# Patient Record
Sex: FEMALE | Race: OTHER | NOT HISPANIC OR LATINO | URBAN - METROPOLITAN AREA
[De-identification: names, ages, dates, MRNs, and addresses within clinical notes are randomized per-mention and may not be internally consistent; named-entity substitution may affect disease eponyms.]

---

## 2018-05-25 ENCOUNTER — EMERGENCY (EMERGENCY)
Facility: HOSPITAL | Age: 38
LOS: 1 days | Discharge: HOME | End: 2018-05-25
Attending: EMERGENCY MEDICINE | Admitting: EMERGENCY MEDICINE
Payer: COMMERCIAL

## 2018-05-25 VITALS
DIASTOLIC BLOOD PRESSURE: 97 MMHG | OXYGEN SATURATION: 99 % | RESPIRATION RATE: 16 BRPM | SYSTOLIC BLOOD PRESSURE: 130 MMHG | HEART RATE: 79 BPM

## 2018-05-25 VITALS
HEART RATE: 80 BPM | RESPIRATION RATE: 16 BRPM | SYSTOLIC BLOOD PRESSURE: 138 MMHG | DIASTOLIC BLOOD PRESSURE: 98 MMHG | OXYGEN SATURATION: 98 % | TEMPERATURE: 98 F

## 2018-05-25 DIAGNOSIS — Z98.1 ARTHRODESIS STATUS: Chronic | ICD-10-CM

## 2018-05-25 LAB
ALBUMIN SERPL ELPH-MCNC: 4.5 G/DL — SIGNIFICANT CHANGE UP (ref 3.3–5)
ALP SERPL-CCNC: 58 U/L — SIGNIFICANT CHANGE UP (ref 40–120)
ALT FLD-CCNC: 9 U/L — SIGNIFICANT CHANGE UP (ref 4–33)
APTT BLD: 29.6 SEC — SIGNIFICANT CHANGE UP (ref 27.5–37.4)
AST SERPL-CCNC: 23 U/L — SIGNIFICANT CHANGE UP (ref 4–32)
BASOPHILS # BLD AUTO: 0.06 K/UL — SIGNIFICANT CHANGE UP (ref 0–0.2)
BASOPHILS NFR BLD AUTO: 0.7 % — SIGNIFICANT CHANGE UP (ref 0–2)
BILIRUB SERPL-MCNC: 0.3 MG/DL — SIGNIFICANT CHANGE UP (ref 0.2–1.2)
BUN SERPL-MCNC: 12 MG/DL — SIGNIFICANT CHANGE UP (ref 7–23)
CALCIUM SERPL-MCNC: 8.9 MG/DL — SIGNIFICANT CHANGE UP (ref 8.4–10.5)
CHLORIDE SERPL-SCNC: 102 MMOL/L — SIGNIFICANT CHANGE UP (ref 98–107)
CO2 SERPL-SCNC: 23 MMOL/L — SIGNIFICANT CHANGE UP (ref 22–31)
CREAT SERPL-MCNC: 0.68 MG/DL — SIGNIFICANT CHANGE UP (ref 0.5–1.3)
D DIMER BLD IA.RAPID-MCNC: < 150 NG/ML — SIGNIFICANT CHANGE UP
EOSINOPHIL # BLD AUTO: 0.15 K/UL — SIGNIFICANT CHANGE UP (ref 0–0.5)
EOSINOPHIL NFR BLD AUTO: 1.7 % — SIGNIFICANT CHANGE UP (ref 0–6)
GLUCOSE SERPL-MCNC: 96 MG/DL — SIGNIFICANT CHANGE UP (ref 70–99)
HCG SERPL-ACNC: < 5 MIU/ML — SIGNIFICANT CHANGE UP
HCT VFR BLD CALC: 42.8 % — SIGNIFICANT CHANGE UP (ref 34.5–45)
HGB BLD-MCNC: 13.9 G/DL — SIGNIFICANT CHANGE UP (ref 11.5–15.5)
IMM GRANULOCYTES # BLD AUTO: 0.04 # — SIGNIFICANT CHANGE UP
IMM GRANULOCYTES NFR BLD AUTO: 0.4 % — SIGNIFICANT CHANGE UP (ref 0–1.5)
INR BLD: 1.08 — SIGNIFICANT CHANGE UP (ref 0.88–1.17)
LYMPHOCYTES # BLD AUTO: 2.41 K/UL — SIGNIFICANT CHANGE UP (ref 1–3.3)
LYMPHOCYTES # BLD AUTO: 27.1 % — SIGNIFICANT CHANGE UP (ref 13–44)
MCHC RBC-ENTMCNC: 28.6 PG — SIGNIFICANT CHANGE UP (ref 27–34)
MCHC RBC-ENTMCNC: 32.5 % — SIGNIFICANT CHANGE UP (ref 32–36)
MCV RBC AUTO: 88.1 FL — SIGNIFICANT CHANGE UP (ref 80–100)
MONOCYTES # BLD AUTO: 0.6 K/UL — SIGNIFICANT CHANGE UP (ref 0–0.9)
MONOCYTES NFR BLD AUTO: 6.7 % — SIGNIFICANT CHANGE UP (ref 2–14)
NEUTROPHILS # BLD AUTO: 5.63 K/UL — SIGNIFICANT CHANGE UP (ref 1.8–7.4)
NEUTROPHILS NFR BLD AUTO: 63.4 % — SIGNIFICANT CHANGE UP (ref 43–77)
NRBC # FLD: 0 — SIGNIFICANT CHANGE UP
PLATELET # BLD AUTO: 251 K/UL — SIGNIFICANT CHANGE UP (ref 150–400)
PMV BLD: 10.4 FL — SIGNIFICANT CHANGE UP (ref 7–13)
POTASSIUM SERPL-MCNC: 4.4 MMOL/L — SIGNIFICANT CHANGE UP (ref 3.5–5.3)
POTASSIUM SERPL-SCNC: 4.4 MMOL/L — SIGNIFICANT CHANGE UP (ref 3.5–5.3)
PROT SERPL-MCNC: 7.3 G/DL — SIGNIFICANT CHANGE UP (ref 6–8.3)
PROTHROM AB SERPL-ACNC: 12 SEC — SIGNIFICANT CHANGE UP (ref 9.8–13.1)
RBC # BLD: 4.86 M/UL — SIGNIFICANT CHANGE UP (ref 3.8–5.2)
RBC # FLD: 11.2 % — SIGNIFICANT CHANGE UP (ref 10.3–14.5)
SODIUM SERPL-SCNC: 140 MMOL/L — SIGNIFICANT CHANGE UP (ref 135–145)
TROPONIN T SERPL-MCNC: < 0.06 NG/ML — SIGNIFICANT CHANGE UP (ref 0–0.06)
WBC # BLD: 8.89 K/UL — SIGNIFICANT CHANGE UP (ref 3.8–10.5)
WBC # FLD AUTO: 8.89 K/UL — SIGNIFICANT CHANGE UP (ref 3.8–10.5)

## 2018-05-25 PROCEDURE — 93010 ELECTROCARDIOGRAM REPORT: CPT

## 2018-05-25 PROCEDURE — 71046 X-RAY EXAM CHEST 2 VIEWS: CPT | Mod: 26

## 2018-05-25 PROCEDURE — 99284 EMERGENCY DEPT VISIT MOD MDM: CPT | Mod: 25

## 2018-05-25 RX ORDER — IBUPROFEN 200 MG
600 TABLET ORAL ONCE
Qty: 0 | Refills: 0 | Status: COMPLETED | OUTPATIENT
Start: 2018-05-25 | End: 2018-05-25

## 2018-05-25 RX ADMIN — Medication 600 MILLIGRAM(S): at 08:51

## 2018-05-25 NOTE — ED ADULT NURSE NOTE - OBJECTIVE STATEMENT
38y/o F rcvd to 8 c/o chest pain, generalized.  Pt pain is reproducible w/ palpation, is midsternal.  Denies trauma, not made worse/better by exertion or rest.  Denies sob/palpitation/dizziness/lightheadedness.  No trauma/activity assc w/ pain.  No cardiac hx.  Pt does report had cough a few weeks ago, resolved now.  No lext swelling.  No PMHx/daily meds, only PSHx of c-spine fusion surgery, scar noted to L anterior neck.  Pt aaox4, ambulatory/self-care. HR NSR on cm, resps even/unlabored on RA, BP Stable, afebrile. Appearing comfortable and in no distress. IVL Placed to L AC #20g, labs drawn/sent as ordered. Pt advised need urine for UCG.  Taken to CXR.  Will monitor.

## 2018-05-25 NOTE — ED ADULT TRIAGE NOTE - CHIEF COMPLAINT QUOTE
pt c/o intermittent left sided CP and SOB, since yesterday- pain worsening with deep inspiration- denies any PMH- pt has even unlabored respirations, denies dizziness, weakness, appears comfortable

## 2018-05-25 NOTE — ED PROVIDER NOTE - PROGRESS NOTE DETAILS
JW 38 y/o female with no pmhx presents to ED for left sided chest pain since yesterday. Described as pleuritic "pressure," worse with palpation, intermittent, lasting few seconds at a time.  VSS WNL.  Physical Exam: General: alert and oriented x3 in no acute distress.  Cardiovascular: Regular rate and rhythm, S1 and S2 normal.  No murmurs, rubs, or gallops.  Pulses equal bilaterally.  No carotid bruits.  No peripheral edema or JVD.  Respiratory: No respiratory distress.  Lungs clear to auscultation bilaterally without adventitial breath sounds.  Abdominal: Non-distended, normal bowel sounds in all four quadrants, non tender to percussion and palpation in all four quadrants.  No mass, rigidity, or guarding.  Extremities: No sign of trauma, inflammation, or effusion.  Full range of motion in the cervical, lumbar, and thoracic spine and all four extremities without limitation.  No peripheral edema.  Neurological: AOx3 NAD.  Cranial nerves I-XII intact.  Normal gross motor and sensory function. Pt ambulatory. 5/5 muscular strength with normal bulk and tone.  DTRs 2+ bilaterally.  No dysmetria or dysdiadochokinesia.  No focal neurological deficit.  No neck stiffness, photophobia, rash, or meningismus. DDX ACS, PE, musculoskeletal pain.  R/O ACS, R/O PE, treat symptomatically, reassess. EKG reveals no evidence of ACS.  Trop x1 negative.  HEART 0.  No fever or EKG findings suggestive of pericarditis, or myocarditis.  No Delgadillo's Triad or signs of pericardial tamponade.  No clinical findings suggestive of  pulmonary embolism.  CXR normal with clear lungs, normal mediastinum, and bilateral breath sounds.  No findings suggestive of pneumothorax, pneumonia, or esophageal perforation.  Historically pain not abrupt in onset, not tearing or ripping, pulses are symmetric, no murmur noted, no clinical findings suggestive of aortic dissection. ELLA Cifuentes - pt feeling better with NSAIDs, amenable for dc home

## 2018-05-25 NOTE — ED PROVIDER NOTE - RESPIRATORY, MLM
Breath sounds clear and equal bilaterally. NAD. No rales rhonchi or wheezing b/l. +left sided chest wall tenderness, reproducible.

## 2018-05-25 NOTE — ED PROVIDER NOTE - MEDICAL DECISION MAKING DETAILS
38 y/o female with no pmhx presents to ED for left sided chest pain since yesterday in setting of resolved URI 2 weeks ago. likely costochondritis - will send dimer, labs, cardiac enzymes, chest xray. EKG unchanged from previous with T wave inversions in V1/V2.

## 2018-05-25 NOTE — ED PROVIDER NOTE - OBJECTIVE STATEMENT
36 y/o female with no pmhx presents to ED for left sided chest pain since yesterday. Described as pleuritic "pressure," worse with palpation, intermittent, lasting few seconds at a time. Nonexertional, not positional. States had a dry and productive cough 2 weeks ago associated with nasal congestion and sore throat that resolved, thinks it was her allergies. +IUD, travel to [place] in April. Mother MI age 65. No cardiac hx or work up in past. No fever, chills, difficulty swallowing, sob, palpitations, wood, abd pain, n/v, diaphoresis, weakness, numbness, LE edema, recent surgeries, hospitalizations, hx of dvt or pe, family hx of sudden cardiac death. 36 y/o female with no pmhx presents to ED for left sided chest pain since yesterday. Described as pleuritic "pressure," worse with palpation, intermittent, lasting few seconds at a time. Non-radiating, nonexertional, not positional. States had a dry and productive cough 2 weeks ago associated with nasal congestion and sore throat that resolved, thinks it was her allergies. +IUD, travel to [place] in April. Mother MI age 65. No cardiac hx or work up in past. No fever, chills, difficulty swallowing, sob, palpitations, wood, abd pain, n/v, diaphoresis, weakness, numbness, LE edema, recent surgeries, hospitalizations, hx of dvt or pe, family hx of sudden cardiac death. 38 y/o female with no pmhx presents to ED for left sided chest pain since yesterday. Described as pleuritic "pressure," worse with palpation, intermittent, lasting few seconds at a time. Non-radiating, nonexertional, not positional. States had a dry cough 2 weeks ago associated with nasal congestion and sore throat that resolved, thinks it was her allergies. +IUD, travel to [place] in April. Mother MI age 65. No cardiac hx or work up in past. No fever, chills, difficulty swallowing, sob, palpitations, wood, abd pain, n/v, diaphoresis, weakness, numbness, LE edema, recent surgeries, hospitalizations, hx of dvt or pe, family hx of sudden cardiac death.

## 2018-05-25 NOTE — ED PROVIDER NOTE - ATTENDING CONTRIBUTION TO CARE
Pt was seen and evaluated by me. Pt states having a cough and congestion over the past 2 weeks. Pt notes over the past day having intermittent chest discomfort with cough. Pt denies any fever, chills, nausea, vomiting, SOB, or abd pain. Lungs CTA b/l. RRR. Abd soft, non-tender. Tenderness to anterior chest wall.

## 2019-01-10 ENCOUNTER — EMERGENCY (EMERGENCY)
Facility: HOSPITAL | Age: 39
LOS: 1 days | End: 2019-01-10

## 2019-01-10 VITALS
TEMPERATURE: 98 F | RESPIRATION RATE: 19 BRPM | HEART RATE: 86 BPM | HEIGHT: 61 IN | WEIGHT: 134.04 LBS | SYSTOLIC BLOOD PRESSURE: 141 MMHG | OXYGEN SATURATION: 98 % | DIASTOLIC BLOOD PRESSURE: 89 MMHG

## 2019-01-10 DIAGNOSIS — Z98.1 ARTHRODESIS STATUS: Chronic | ICD-10-CM

## 2019-01-10 NOTE — ED ADULT NURSE NOTE - NSIMPLEMENTINTERV_GEN_ALL_ED
Implemented All Universal Safety Interventions:  [place] to call system. Call bell, personal items and telephone within reach. Instruct patient to call for assistance. Room bathroom lighting operational. Non-slip footwear when patient is off stretcher. Physically safe environment: no spills, clutter or unnecessary equipment. Stretcher in lowest position, wheels locked, appropriate side rails in place.

## 2019-01-10 NOTE — ED ADULT TRIAGE NOTE - CHIEF COMPLAINT QUOTE
chest pain, nausea, headache x 1 week, dec sleep, pain relieved with aspirin last night, denies long distance travel/+nonsmoker, LMP: 12/2018, +IUD

## 2019-10-22 ENCOUNTER — EMERGENCY (EMERGENCY)
Facility: HOSPITAL | Age: 39
LOS: 1 days | Discharge: HOME | End: 2019-10-22
Attending: EMERGENCY MEDICINE | Admitting: EMERGENCY MEDICINE
Payer: COMMERCIAL

## 2019-10-22 VITALS — DIASTOLIC BLOOD PRESSURE: 74 MMHG | SYSTOLIC BLOOD PRESSURE: 114 MMHG

## 2019-10-22 VITALS
TEMPERATURE: 98 F | HEART RATE: 84 BPM | DIASTOLIC BLOOD PRESSURE: 81 MMHG | OXYGEN SATURATION: 97 % | RESPIRATION RATE: 16 BRPM | SYSTOLIC BLOOD PRESSURE: 778 MMHG

## 2019-10-22 DIAGNOSIS — Z98.1 ARTHRODESIS STATUS: Chronic | ICD-10-CM

## 2019-10-22 PROBLEM — E78.00 PURE HYPERCHOLESTEROLEMIA, UNSPECIFIED: Chronic | Status: ACTIVE | Noted: 2019-01-10

## 2019-10-22 LAB
ALBUMIN SERPL ELPH-MCNC: 4.4 G/DL — SIGNIFICANT CHANGE UP (ref 3.3–5)
ALP SERPL-CCNC: 57 U/L — SIGNIFICANT CHANGE UP (ref 40–120)
ALT FLD-CCNC: 15 U/L — SIGNIFICANT CHANGE UP (ref 4–33)
ANION GAP SERPL CALC-SCNC: 12 MMO/L — SIGNIFICANT CHANGE UP (ref 7–14)
APPEARANCE UR: SIGNIFICANT CHANGE UP
APTT BLD: 26.4 SEC — LOW (ref 27.5–36.3)
AST SERPL-CCNC: 33 U/L — HIGH (ref 4–32)
BACTERIA # UR AUTO: HIGH
BASOPHILS # BLD AUTO: 0.03 K/UL — SIGNIFICANT CHANGE UP (ref 0–0.2)
BASOPHILS # BLD AUTO: 0.05 K/UL — SIGNIFICANT CHANGE UP (ref 0–0.2)
BASOPHILS NFR BLD AUTO: 0.4 % — SIGNIFICANT CHANGE UP (ref 0–2)
BASOPHILS NFR BLD AUTO: 0.7 % — SIGNIFICANT CHANGE UP (ref 0–2)
BILIRUB SERPL-MCNC: 0.2 MG/DL — SIGNIFICANT CHANGE UP (ref 0.2–1.2)
BILIRUB UR-MCNC: NEGATIVE — SIGNIFICANT CHANGE UP
BLD GP AB SCN SERPL QL: NEGATIVE — SIGNIFICANT CHANGE UP
BLOOD UR QL VISUAL: SIGNIFICANT CHANGE UP
BUN SERPL-MCNC: 10 MG/DL — SIGNIFICANT CHANGE UP (ref 7–23)
CALCIUM SERPL-MCNC: 9.3 MG/DL — SIGNIFICANT CHANGE UP (ref 8.4–10.5)
CHLORIDE SERPL-SCNC: 103 MMOL/L — SIGNIFICANT CHANGE UP (ref 98–107)
CO2 SERPL-SCNC: 23 MMOL/L — SIGNIFICANT CHANGE UP (ref 22–31)
COLOR SPEC: SIGNIFICANT CHANGE UP
CREAT SERPL-MCNC: 0.68 MG/DL — SIGNIFICANT CHANGE UP (ref 0.5–1.3)
EOSINOPHIL # BLD AUTO: 0.14 K/UL — SIGNIFICANT CHANGE UP (ref 0–0.5)
EOSINOPHIL # BLD AUTO: 0.2 K/UL — SIGNIFICANT CHANGE UP (ref 0–0.5)
EOSINOPHIL NFR BLD AUTO: 1.9 % — SIGNIFICANT CHANGE UP (ref 0–6)
EOSINOPHIL NFR BLD AUTO: 2.7 % — SIGNIFICANT CHANGE UP (ref 0–6)
EPI CELLS # UR: SIGNIFICANT CHANGE UP
GLUCOSE SERPL-MCNC: 71 MG/DL — SIGNIFICANT CHANGE UP (ref 70–99)
GLUCOSE UR-MCNC: NEGATIVE — SIGNIFICANT CHANGE UP
HCG SERPL-ACNC: < 5 MIU/ML — SIGNIFICANT CHANGE UP
HCT VFR BLD CALC: 40.2 % — SIGNIFICANT CHANGE UP (ref 34.5–45)
HCT VFR BLD CALC: 41.6 % — SIGNIFICANT CHANGE UP (ref 34.5–45)
HGB BLD-MCNC: 12.5 G/DL — SIGNIFICANT CHANGE UP (ref 11.5–15.5)
HGB BLD-MCNC: 13.4 G/DL — SIGNIFICANT CHANGE UP (ref 11.5–15.5)
IMM GRANULOCYTES NFR BLD AUTO: 0.4 % — SIGNIFICANT CHANGE UP (ref 0–1.5)
IMM GRANULOCYTES NFR BLD AUTO: 0.4 % — SIGNIFICANT CHANGE UP (ref 0–1.5)
INR BLD: 1 — SIGNIFICANT CHANGE UP (ref 0.88–1.17)
KETONES UR-MCNC: NEGATIVE — SIGNIFICANT CHANGE UP
LEUKOCYTE ESTERASE UR-ACNC: HIGH
LYMPHOCYTES # BLD AUTO: 2 K/UL — SIGNIFICANT CHANGE UP (ref 1–3.3)
LYMPHOCYTES # BLD AUTO: 2.4 K/UL — SIGNIFICANT CHANGE UP (ref 1–3.3)
LYMPHOCYTES # BLD AUTO: 27.4 % — SIGNIFICANT CHANGE UP (ref 13–44)
LYMPHOCYTES # BLD AUTO: 31.9 % — SIGNIFICANT CHANGE UP (ref 13–44)
MCHC RBC-ENTMCNC: 27.5 PG — SIGNIFICANT CHANGE UP (ref 27–34)
MCHC RBC-ENTMCNC: 28.2 PG — SIGNIFICANT CHANGE UP (ref 27–34)
MCHC RBC-ENTMCNC: 31.1 % — LOW (ref 32–36)
MCHC RBC-ENTMCNC: 32.2 % — SIGNIFICANT CHANGE UP (ref 32–36)
MCV RBC AUTO: 87.6 FL — SIGNIFICANT CHANGE UP (ref 80–100)
MCV RBC AUTO: 88.4 FL — SIGNIFICANT CHANGE UP (ref 80–100)
MONOCYTES # BLD AUTO: 0.59 K/UL — SIGNIFICANT CHANGE UP (ref 0–0.9)
MONOCYTES # BLD AUTO: 0.67 K/UL — SIGNIFICANT CHANGE UP (ref 0–0.9)
MONOCYTES NFR BLD AUTO: 8.1 % — SIGNIFICANT CHANGE UP (ref 2–14)
MONOCYTES NFR BLD AUTO: 8.9 % — SIGNIFICANT CHANGE UP (ref 2–14)
NEUTROPHILS # BLD AUTO: 4.17 K/UL — SIGNIFICANT CHANGE UP (ref 1.8–7.4)
NEUTROPHILS # BLD AUTO: 4.52 K/UL — SIGNIFICANT CHANGE UP (ref 1.8–7.4)
NEUTROPHILS NFR BLD AUTO: 55.4 % — SIGNIFICANT CHANGE UP (ref 43–77)
NEUTROPHILS NFR BLD AUTO: 61.8 % — SIGNIFICANT CHANGE UP (ref 43–77)
NITRITE UR-MCNC: NEGATIVE — SIGNIFICANT CHANGE UP
NRBC # FLD: 0 K/UL — SIGNIFICANT CHANGE UP (ref 0–0)
NRBC # FLD: 0 K/UL — SIGNIFICANT CHANGE UP (ref 0–0)
PH UR: 6 — SIGNIFICANT CHANGE UP (ref 5–8)
PLATELET # BLD AUTO: 227 K/UL — SIGNIFICANT CHANGE UP (ref 150–400)
PLATELET # BLD AUTO: 255 K/UL — SIGNIFICANT CHANGE UP (ref 150–400)
PMV BLD: 10.8 FL — SIGNIFICANT CHANGE UP (ref 7–13)
PMV BLD: 11 FL — SIGNIFICANT CHANGE UP (ref 7–13)
POTASSIUM SERPL-MCNC: 4.8 MMOL/L — SIGNIFICANT CHANGE UP (ref 3.5–5.3)
POTASSIUM SERPL-SCNC: 4.8 MMOL/L — SIGNIFICANT CHANGE UP (ref 3.5–5.3)
PROT SERPL-MCNC: 7.4 G/DL — SIGNIFICANT CHANGE UP (ref 6–8.3)
PROT UR-MCNC: 300 — HIGH
PROTHROM AB SERPL-ACNC: 11.4 SEC — SIGNIFICANT CHANGE UP (ref 9.8–13.1)
RBC # BLD: 4.55 M/UL — SIGNIFICANT CHANGE UP (ref 3.8–5.2)
RBC # BLD: 4.75 M/UL — SIGNIFICANT CHANGE UP (ref 3.8–5.2)
RBC # FLD: 11.6 % — SIGNIFICANT CHANGE UP (ref 10.3–14.5)
RBC # FLD: 11.7 % — SIGNIFICANT CHANGE UP (ref 10.3–14.5)
RBC CASTS # UR COMP ASSIST: >50 — HIGH (ref 0–?)
RH IG SCN BLD-IMP: POSITIVE — SIGNIFICANT CHANGE UP
SODIUM SERPL-SCNC: 138 MMOL/L — SIGNIFICANT CHANGE UP (ref 135–145)
SP GR SPEC: 1.02 — SIGNIFICANT CHANGE UP (ref 1–1.04)
UROBILINOGEN FLD QL: NORMAL — SIGNIFICANT CHANGE UP
WBC # BLD: 7.31 K/UL — SIGNIFICANT CHANGE UP (ref 3.8–10.5)
WBC # BLD: 7.52 K/UL — SIGNIFICANT CHANGE UP (ref 3.8–10.5)
WBC # FLD AUTO: 7.31 K/UL — SIGNIFICANT CHANGE UP (ref 3.8–10.5)
WBC # FLD AUTO: 7.52 K/UL — SIGNIFICANT CHANGE UP (ref 3.8–10.5)
WBC UR QL: SIGNIFICANT CHANGE UP (ref 0–?)

## 2019-10-22 PROCEDURE — 99284 EMERGENCY DEPT VISIT MOD MDM: CPT

## 2019-10-22 PROCEDURE — 76830 TRANSVAGINAL US NON-OB: CPT | Mod: 26

## 2019-10-22 RX ORDER — CEPHALEXIN 500 MG
500 CAPSULE ORAL ONCE
Refills: 0 | Status: COMPLETED | OUTPATIENT
Start: 2019-10-22 | End: 2019-10-22

## 2019-10-22 RX ORDER — ACETAMINOPHEN 500 MG
650 TABLET ORAL ONCE
Refills: 0 | Status: COMPLETED | OUTPATIENT
Start: 2019-10-22 | End: 2019-10-22

## 2019-10-22 RX ORDER — TRANEXAMIC ACID 100 MG/ML
2 INJECTION, SOLUTION INTRAVENOUS
Qty: 30 | Refills: 0
Start: 2019-10-22 | End: 2019-10-26

## 2019-10-22 RX ORDER — TRANEXAMIC ACID 100 MG/ML
1300 INJECTION, SOLUTION INTRAVENOUS ONCE
Refills: 0 | Status: COMPLETED | OUTPATIENT
Start: 2019-10-22 | End: 2019-10-22

## 2019-10-22 RX ORDER — CEPHALEXIN 500 MG
1 CAPSULE ORAL
Qty: 14 | Refills: 0
Start: 2019-10-22 | End: 2019-10-28

## 2019-10-22 RX ADMIN — Medication 650 MILLIGRAM(S): at 12:41

## 2019-10-22 RX ADMIN — Medication 500 MILLIGRAM(S): at 12:41

## 2019-10-22 RX ADMIN — Medication 650 MILLIGRAM(S): at 10:43

## 2019-10-22 RX ADMIN — TRANEXAMIC ACID 1300 MILLIGRAM(S): 100 INJECTION, SOLUTION INTRAVENOUS at 12:41

## 2019-10-22 NOTE — ED PROVIDER NOTE - PHYSICAL EXAMINATION
HDS, NAD, MMM and pink, eyes clear, lungs CTAB, heart sounds normal, abd soft, NT, ND, no CVAT, LEs without edema, wwp, skin normal temperature and color, neuro: alert and oriented, no focal deficits, radial pulses 2+ bilat, pelvic exam with welling dark blood and clots

## 2019-10-22 NOTE — ED ADULT TRIAGE NOTE - CHIEF COMPLAINT QUOTE
Pt c/o heavy vaginal bleeding x 3 days, states she had heavy bleeding last month after removing her IUD. C/o abd cramping.

## 2019-10-22 NOTE — ED PROVIDER NOTE - PATIENT PORTAL LINK FT
You can access the FollowMyHealth Patient Portal offered by [facility] by registering at the following website: [email or web address]. By joining [employer]’s FollowMyHealth portal, you will also be able to view your health information using other applications (apps) compatible with our system.

## 2019-10-22 NOTE — ED ADULT NURSE NOTE - OBJECTIVE STATEMENT
Intake RN: Patient is a 37 y/o F a&ox4 p/w a c/c of intermittent vaginal bleeding x 1 month.  Denies hx of blood transfusion.  Endorsing weakness, SOB, lethargy, intermittent abd cramping.  Respirations unlabored, 20 gauge PIV in right hand.

## 2019-10-22 NOTE — ED PROVIDER NOTE - OBJECTIVE STATEMENT
Cabot: 38F with PMH of menorrhagia requiring IUD placement, with IUD removal 1 month ago, here with vaginal bleeding and abdominal cramping.  No F/C/N/V/D/urinary sx.  Upreg neg.  Reports 1 pad every 30 min to hour.

## 2019-10-22 NOTE — ED PROVIDER NOTE - CLINICAL SUMMARY MEDICAL DECISION MAKING FREE TEXT BOX
Cabot: 38F with PMH of menorrhagia requiring IUD placement, with IUD removal 1 month ago, here with vaginal bleeding and abdominal cramping.  No F/C/N/V/D/urinary sx.  Upreg neg.  Reports 1 pad every 30 min to hour.  HDS but significant bleeding on exam.  Upreg neg.  Imp: dysfunctional vaginal bleeding.  Will send basic labs, T+S, TVUS, reassess.

## 2019-10-22 NOTE — ED PROVIDER NOTE - CARE PLAN
Principal Discharge DX:	Vaginal bleeding Principal Discharge DX:	Vaginal bleeding  Secondary Diagnosis:	Adenomyosis

## 2019-10-22 NOTE — ED PROVIDER NOTE - NSFOLLOWUPINSTRUCTIONS_ED_ALL_ED_FT
You have been seen for vaginal bleeding.  A possible explanation is adenomyosis, which was seen on your ultrasound.     Uterine adenomyosis is a disorder in which endometrial glands and stroma are present within the myometrium (uterine musculature). Women with symptomatic adenomyosis present with uterine enlargement, abnormal uterine bleeding, and painful menses.     You have been given a medication called tranexamic acid (TXA, Lysteda) in the ED to help slow down the bleeding.  You will take Lysteda 2 tablets three times per day for the next 5 days.      You were also found to have a urinary tract infection.  Please take keflex 1 tablet twice daily for the next 7 days.    Please follow up with your gynecologist to discuss your bleeding and treatment options.      Please come back to the ED if you develop worsened bleeding (1 pad per hour or more), you lose consciousness, develop dizziness or shortness of breath, or if you have fever or flank pain.

## 2020-11-17 NOTE — ED PROVIDER NOTE - NS HIV RISK FACTOR YES
"ICU End of Shift Summary. See flowsheets for vital signs and detailed assessment.    Changes this shift: Vented on precedex. Slept most of the night, no neuro changes, no complaints of pain. Bradycardic when asleep. BP adequate without pressors. Tmax 99.0. Secretions decreased throughout shift, FiO2 needs up to 50% from 45%. Pt reports taking deep breaths \"to get more oxygen,\" but denies SOB. No BM. Adequate UOP with ~1.5L out. BG remains elevated at 2000 and 0400 checks despite increased lantus dose in AM and high resistance sliding scale, MICU aware.     Plan: PST and extubate as able. PT/OT and activity up to chair. Pull art line and zuniga as able. Monitor BG.     Problem: Gas Exchange Impaired  Goal: Optimal Gas Exchange  Intervention: Optimize Oxygenation and Ventilation    Problem: Diabetes Comorbidity  Goal: Blood Glucose Level Within Targeted Range  Outcome: No Change        " Declined

## 2022-12-11 ENCOUNTER — EMERGENCY (EMERGENCY)
Facility: HOSPITAL | Age: 42
LOS: 1 days | Discharge: HOME | End: 2022-12-11
Attending: EMERGENCY MEDICINE
Payer: MEDICAID

## 2022-12-11 VITALS
HEART RATE: 83 BPM | SYSTOLIC BLOOD PRESSURE: 163 MMHG | DIASTOLIC BLOOD PRESSURE: 122 MMHG | HEIGHT: 61 IN | RESPIRATION RATE: 18 BRPM | OXYGEN SATURATION: 100 % | TEMPERATURE: 98 F | WEIGHT: 143.08 LBS

## 2022-12-11 DIAGNOSIS — Z98.1 ARTHRODESIS STATUS: Chronic | ICD-10-CM

## 2022-12-11 PROCEDURE — 99284 EMERGENCY DEPT VISIT MOD MDM: CPT

## 2022-12-11 PROCEDURE — 99283 EMERGENCY DEPT VISIT LOW MDM: CPT

## 2022-12-11 NOTE — ED PROVIDER NOTE - NSFOLLOWUPINSTRUCTIONS_ED_ALL_ED_FT
Please see the information of head injury.    Take Tylenol (2 tablets of 500mg every 8hours) for pain as needed.    Follow up with your primary Dr. for reevaluation and repeat blood pressure, call tomorrow for appointment.    Return for any concerns, fever, numbness, weakness, vomiting, confusion, or worsening pain. YOU WERE SEEN IN THE ED FOR: head trauma    WHILE YOU WERE HERE, YOU HAD: an evaluation.  We discussed the possibility that you may have a concussion.      FOR PAIN, YOU MAY TAKE TYLENOL (ACETAMINOPHEN) AND/OR IBUPROFEN (Advil or Motrin). FOLLOW THE INSTRUCTIONS ON THE LABEL/CONTAINER.  DO NOT EXCEED 4000MG OF TYLENOL (ACETAMINOPHEN) IN A 24 HOUR PERIOD.    WHILE YOU WERE HERE, YOUR BLOOD PRESSURE WAS ELEVATED.  PLEASE FOLLOW UP WITH YOUR PRIVATE PHYSICIAN WITHIN THE NEXT 48 HOURS. BRING COPIES OF YOUR RESULTS.  PLEASE CALL THE CONCUSSION PROGRAM TO SET UP FOLLOW UP.    RETURN TO THE EMERGENCY DEPARTMENT IF YOU EXPERIENCE ANY NEW/CONCERNING/WORSENING SYMPTOMS SUCH AS BUT NOT LIMITED TO: change in vision, double vision, sudden loss of vision, change in hearing, ringing in your ears, worsening headache, slurred speech, numbness, weakness or tingling in extremities, loss of urinary or bowel continence, chest pain, shortness of breath, abdominal pain, persistent vomiting or any other concerns.

## 2022-12-11 NOTE — ED PROVIDER NOTE - OBJECTIVE STATEMENT
41y F w/ PMHx of HLD presents to the ED c/o R sided neck pain, back pain, HA s/p mechanical slip and fall on tile earlier today at around 6:00 PM. Rates the HA a 9 out of 10 on the severity scale. Pt was standing and going to sit on a chair when the chair rolled away leading to fall on head. Denies LOC, AC use. Last took Tylenol around 8:30 PM w/o relief. Denies blurred vision, dizziness, N/V, numbness, tingling, weakness. 41y Female, no PMHx, presents to the ED c/o R sided neck pain, back pain, HA s/p mechanical slip and fall on tile earlier today at around 6:00 PM. Rates the HA a 9 out of 10 on the severity scale. Pt was standing and going to sit on a chair when the chair rolled away leading to fall on head. Denies LOC, AC use. Last took Tylenol around 8:30 PM w/o relief. Denies blurred vision, dizziness, N/V, numbness, tingling, weakness.

## 2022-12-11 NOTE — ED PROVIDER NOTE - PHYSICAL EXAMINATION
NAD, Hypertensive. Afebrile. +PERRL, EOMI. +Right parietal tender without obvious hematoma. Neck supple. No spinal midline tender. +Right cervical trapezium and lower para lumbar tender. LUngs clear. ABD soft, non tender. No cva tender. No pelvic or hip tender. Neuro- intact.

## 2022-12-11 NOTE — ED PROVIDER NOTE - PATIENT PORTAL LINK FT
You can access the FollowMyHealth Patient Portal offered by [facility] by registering at the following website: [email or web address]. By joining [employer]’s FollowMyHealth portal, you will also be able to view your health information using other applications (apps) compatible with our system.

## 2022-12-11 NOTE — ED PROVIDER NOTE - ATTENDING APP SHARED VISIT CONTRIBUTION OF CARE
Attending MD Cardoza: I personally have seen and examined this patient.  NP note reviewed and agree on plan of care and except where noted.  See below for details.     Seen In [place] 33L      CN 2-12 grossly intact,   [place] Head CT negative  Shared decision making  no CT, will follow up with concussion clinic    TO BE COMPLETED Attending MD Cardoza: I personally have seen and examined this patient.  NP note reviewed and agree on plan of care and except where noted.  See below for details.     Seen In Blue 33L    41F with no reported contributory PMH/PSH/Meds, AC use, no known drug allergies presents to the ED s/p head trauma.  Reports at around 6pm tried to sit on rolling chair, fell and hit the R posterior aspect of head.  Denies bleeding.  Denies preceding dizziness, weakness, sensory changes.  Denies LOC.  Reports took two Tylenol at around 7:30pm, reports mild improvement.  Denies change in vision, double vision, sudden loss of vision. Denies loss of urinary or bowel continence. Denies numbness, weakness or tingling in extremities. Denies chest pain, shortness of breath, abdominal pain, nausea, vomiting, diarrhea, urinary complaints. Denies recent illness, fevers.  Denies previous concussion.    Exam:   General: NAD  HEENT: head NCAT with hematoma at R occiput, no break in skin, airway patent, no dried blood at nares, no blood in oropharynx, PERRL, EOMI  Chest: symmetric chest rise, no increased work of breathing  MSK: ranging neck freely, no tenderness to midline palpation  Neuro: CN 2-12 grossly intact, moving all extremities spontaneously, sensory grossly intact, no gross neuro deficits  Psych: normal mood and affect     A/P: 41F with head trauma, headache, possible concussion, extensive discussion about head trauma, concussion.  Reports that she does not wish to wait for CT head.  [place] Head CT negative, shared decision making, no head CT at this time.  Patient blood pressure elevated.  Verbalized understanding and will follow up with PMD.  Follow up instructions given, importance of follow up emphasized, return to ED parameters reviewed and patient verbalized understanding.  All questions answered, all concerns addressed.

## 2022-12-11 NOTE — ED PROVIDER NOTE - NS ED ATTENDING STATEMENT MOD
This was a shared visit with the KACIE. I reviewed and verified the documentation and independently performed the documented:

## 2022-12-11 NOTE — ED ADULT NURSE NOTE - NSIMPLEMENTINTERV_GEN_ALL_ED
Implemented All Fall Risk Interventions:  [place] to call system. Call bell, personal items and telephone within reach. Instruct patient to call for assistance. Room bathroom lighting operational. Non-slip footwear when patient is off stretcher. Physically safe environment: no spills, clutter or unnecessary equipment. Stretcher in lowest position, wheels locked, appropriate side rails in place. Provide visual cue, wrist band, yellow gown, etc. Monitor gait and stability. Monitor for mental status changes and reorient to person, place, and time. Review medications for side effects contributing to fall risk. Reinforce activity limits and safety measures with patient and family.

## 2022-12-11 NOTE — ED ADULT NURSE NOTE - OBJECTIVE STATEMENT
pt is a 40yo female PMH HLD presenting to the ED following a fall. pt states she slipped and fell backwards onto a tile floor earlier today, hit the back of her head, denies LOC, denies blood thinners. pt endorsing pain to the right side of the back of the head, states that the pain has been radiating to different areas of the head since the fall. pt states she took 2 tylenol earlier with partial relief, wanted to come to ED for further evaluation. pt A&Ox3 gross neuro intact, lungs cta bilaterally, no difficulty speaking in complete sentences, s1s2 heart sounds heard, pulses x 4, diaz x4, abdomen soft nontender nondistended, skin intact. pt denies chest pain, sob, n/v/d, abdominal pain, f/c, urinary symptoms, hematuria

## 2022-12-12 VITALS
DIASTOLIC BLOOD PRESSURE: 107 MMHG | OXYGEN SATURATION: 100 % | HEART RATE: 67 BPM | RESPIRATION RATE: 17 BRPM | SYSTOLIC BLOOD PRESSURE: 161 MMHG

## 2023-08-04 ENCOUNTER — EMERGENCY (EMERGENCY)
Facility: HOSPITAL | Age: 43
LOS: 1 days | Discharge: HOME | End: 2023-08-04
Admitting: EMERGENCY MEDICINE
Payer: COMMERCIAL

## 2023-08-04 VITALS
TEMPERATURE: 98 F | SYSTOLIC BLOOD PRESSURE: 142 MMHG | RESPIRATION RATE: 16 BRPM | HEART RATE: 98 BPM | OXYGEN SATURATION: 98 % | DIASTOLIC BLOOD PRESSURE: 97 MMHG

## 2023-08-04 DIAGNOSIS — Z98.1 ARTHRODESIS STATUS: Chronic | ICD-10-CM

## 2023-08-04 PROCEDURE — 73030 X-RAY EXAM OF SHOULDER: CPT | Mod: 26,LT

## 2023-08-04 PROCEDURE — 99284 EMERGENCY DEPT VISIT MOD MDM: CPT

## 2023-08-04 RX ORDER — ACETAMINOPHEN 500 MG
650 TABLET ORAL ONCE
Refills: 0 | Status: COMPLETED | OUTPATIENT
Start: 2023-08-04 | End: 2023-08-04

## 2023-08-04 RX ADMIN — Medication 650 MILLIGRAM(S): at 14:09

## 2023-08-04 NOTE — ED ADULT TRIAGE NOTE - BP NONINVASIVE SYSTOLIC (MM HG)
Electrophysiology Progress Note  Attending Physician: Fernanda Hopkins MD  Hospital Day: 4    Subjective:     Interval History: No events reported overnight. Telemetry with NSR, rates in the 50's-60's, B/P in the 120's to 140's SBP. Still awaiting cath films from OSH. INR now 4.6 this AM from 4.3 yesterday, LFT's still elevated, and leukocytosis worsening to 23, though afebrile, cultures negative from admission. Now on a lasix gtt @ 20 mg/hr.    Medications:   Continuous Infusions:   furosemide (LASIX) 5 mg/mL infusion (non-titrating) 20 mg/hr (04/06/17 0900)       Scheduled Meds:   amiodarone  400 mg Oral BID    aspirin  81 mg Oral Daily    mexiletine  200 mg Oral Q8H     PRN Meds:acetaminophen, alprazolam, ceFAZolin 2 g/50mL Dextrose IVPB  Objective:     Vitals:  Temp:  [97.4 °F (36.3 °C)-97.9 °F (36.6 °C)]   Pulse:  [54-68]   Resp:  [14-45]   BP: (112-149)/(58-67)   SpO2:  [92 %-99 %]  I/O's:    Intake/Output Summary (Last 24 hours) at 04/06/17 0930  Last data filed at 04/06/17 0900   Gross per 24 hour   Intake           1079.6 ml   Output             2620 ml   Net          -1540.4 ml        Constitutional: NAD, conversant  HEENT: Sclera anicteric, PERRLA, EOMI  Neck: 12-14 cm JVD, no carotid bruits  CV: Franco, no murmur, normal S1/S2  Pulm: Bibasilar crackles  GI: Abdomen soft, NTND, +BS  Extremities: 1+ LE edema, warm and well perfused  Skin: No ecchymosis, erythema, or ulcers    Labs:       Recent Labs  Lab 04/05/17  0232 04/05/17  1338 04/06/17  0322   * 130* 134*   K 4.0 3.7 3.9   CL 96 96 98   CO2 16* 16* 21*   * 115* 119*   CREATININE 3.5* 3.2* 3.1*    114* 103   ANIONGAP 20* 18* 15       Recent Labs  Lab 04/04/17  0410 04/05/17  0232 04/06/17  0322   AST 85* 161* 149*   * 339* 400*   ALKPHOS 82 98 106   BILITOT 0.5 0.6 0.6   ALBUMIN 2.7* 2.7* 2.7*        Recent Labs  Lab 04/04/17  0410 04/05/17  0232 04/06/17  0322   WBC 20.96* 21.47* 24.80*   HGB 11.3* 13.1 13.0   HCT  33.7* 35.9* 36.9*    238 222   GRAN 87.8*  18.3* 85.0* 89.1*  22.0*       Recent Labs  Lab 04/04/17  0410 04/05/17  0232 04/06/17  0322   INR 2.0* 4.3* 4.6*       Recent Labs  Lab 04/03/17  0057   BNP 2705*      Micro:   Blood Cultures  Lab Results   Component Value Date    LABBLOO No Growth to date 04/03/2017    LABBLOO No Growth to date 04/03/2017    LABBLOO No Growth to date 04/03/2017    LABBLOO No Growth to date 04/03/2017    LABBLOO No Growth to date 04/03/2017    LABBLOO No Growth to date 04/03/2017    LABBLOO No Growth to date 04/03/2017    LABBLOO No Growth to date 04/03/2017     Urine Cultures  Lab Results   Component Value Date    LABURIN No growth 04/03/2017       Imaging:   TTE (4/3/2017)  Technical Quality: This study was performed in conjunction with a 3ml intravenous injection of Optison contrast agent because of poor endocardial visualization.     General: A catheter is present in the right-sided cardiac chambers.     Aorta: The aortic root is normal in size, measuring 2.5 cm at sinotubular junction and 2.6 cm at Sinuses of Valsalva. The proximal ascending aorta is normal in size, measuring 2.6 cm across.     Left Atrium: The left atrial volume index is severely enlarged, measuring 80.38 cc/m2.     Left Ventricle: The left ventricle is normal in size, with an end-diastolic diameter of 5.0 cm, and an end-systolic diameter of 4.5 cm. LV wall thickness is normal, with the septum and the posterior wall each measuring 1.0 cm across. Relative wall thickness was normal at 0.40, and the LV mass index was increased at 128.4 g/m2 consistent with eccentric left ventricular hypertrophy. The inferior wall is akinetic. The apex is dyskinetic.   The following segments were akinetic: basal inferoseptum. Global left ventricular systolic function appears severely depressed. Visually estimated ejection fraction is 20-25%. The LV Doppler derived stroke volume equals 25.0 ccs. The E/e'(lat) is 25, consistent  with significant diastolic dysfunction.     Right Atrium: The right atrium is normal in size, measuring 4.6 cm in length and 4.2 cm in width in the apical view.     Right Ventricle: The right ventricle is normal in size measuring 3.4 cm at the base in the apical right ventricle-focused view. Global right ventricular systolic function was not well seen. The estimated PA systolic pressure is greater than 40 mmHg.     Aortic Valve:  The aortic valve is normal in structure. Additionally, there is mild aortic regurgitation.     Mitral Valve:  The mitral valve is normal in structure. There is mild to moderate mitral regurgitation. There is mitral annular calcification.     Tricuspid Valve:  The tricuspid valve is normal in structure. There is mild tricuspid regurgitation.     Pulmonary Valve:  The pulmonic valve is not well seen.     IVC: The IVC is not visualized.     Intracavitary: There is no evidence of pericardial effusion, intracavity mass, thrombi, or vegetation.     Other: There is a bilateral pleural effusion present.     CONCLUSIONS     1 - Eccentric LVH with severely depressed left ventricular systolic function (EF 20-25%). There is layer apical thrombus.    2 - Severe left atrial enlargement.     3 - Left ventricular diastolic dysfunction with increased LAP.     4 - Mild aortic regurgitation.     5 - Mild to moderate mitral regurgitation.     6 - Mild tricuspid regurgitation.     7 - The estimated PA systolic pressure is greater than 40 mmHg.     8 - Bilateral pleural effusion.    EF   Date Value Ref Range Status   2017 20 (A) 55 - 65      EK-lead rhythm strip shows sinus bradycardia with first degree AV block, LBBB morphology with QRS ~150ms     Telemetry: NSR with rates in the 50's-60's overnight    Assessment:   76 y.o. woman with PMH CAD s/p CABGx2 , ICM s/p SC Biotronik AICD implanted on 3/23/2017, CKD 3-4, who was transferred from [place] overnight for management of ventricular  management. EP now on consult.      On amiodarone  mg BID.  On mexiletine  mg TID.   On Coumadin, INR now supra-therapeutic at 4.6, currently holding.     Initial device interrogation with 3 episodes of SVT occurring on 3/30/2017, all without any therapy. These rhythms seem consistent with SVT with abberancy, possibly AVNRT with PVC's vs. AT, although A sensing not as clear with single-lead AICD. Device was set at VVI at 60 bpm, later reporgrammed to VVI 30 yesterday.     12-lead rhythm strip shows sinus bradycardia with first degree AV block, LBBB morphology with QRS ~150ms.  This morning telemetry with NSR rates in the 50's-60's.    Plan:   Wide complex tachycardia  - Unclear if this is SVT with abberancy, possibly AVNRT vs. AT, or a true VT as fusion complexes can be seen on surface ECG  - Awaiting cath films from OSH as unclear what is her coronary anatomy per faxed records  - Continue amiodarone  mg BID and mexiletine 200 mg TID  - Continue Coumadin for LV thrombus, INR currently therapeutic at 4.6, hold for now  - Continue to monitor on telemetry  - Will plan AICD upgrade to BiV once INR lower and liver/leukocytosis issues are fully worked up  - Would consider EPS +/- VT ablation, though with layered LV thrombus cannot be done at this time    Patient seen and examined this morning. Thank you for the opportunity to participate in the care of this interesting patient. Discussed with Dr. Brown, staff attestation to follow.    Signed:  Duke Mcgee MD  Cardiology Fellow - PGY4  Pager: [phone]  4/6/2017 9:34 AM   142
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
## 2023-08-04 NOTE — ED ADULT NURSE NOTE - NSFALLUNIVINTERV_ED_ALL_ED
Bed/Stretcher in lowest position, wheels locked, appropriate side rails in place/Call bell, personal items and telephone in reach/Instruct patient to call for assistance before getting out of bed/chair/stretcher/Non-slip footwear applied when patient is off stretcher/[place] to call system/Physically safe environment - no spills, clutter or unnecessary equipment/Purposeful proactive rounding/Room/bathroom lighting operational, light cord in reach

## 2023-08-04 NOTE — ED PROVIDER NOTE - PATIENT PORTAL LINK FT
You can access the FollowMyHealth Patient Portal offered by [facility] by registering at the following website: [email or web address]. By joining [employer]’s FollowMyHealth portal, you will also be able to view your health information using other applications (apps) compatible with our system.
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
## 2023-12-01 ENCOUNTER — EMERGENCY (EMERGENCY)
Facility: HOSPITAL | Age: 43
LOS: 1 days | Discharge: HOME | End: 2023-12-01
Attending: EMERGENCY MEDICINE
Payer: COMMERCIAL

## 2023-12-01 VITALS
TEMPERATURE: 99 F | OXYGEN SATURATION: 99 % | DIASTOLIC BLOOD PRESSURE: 90 MMHG | RESPIRATION RATE: 17 BRPM | HEART RATE: 77 BPM | SYSTOLIC BLOOD PRESSURE: 135 MMHG

## 2023-12-01 VITALS
HEART RATE: 110 BPM | HEIGHT: 61 IN | TEMPERATURE: 98 F | SYSTOLIC BLOOD PRESSURE: 146 MMHG | DIASTOLIC BLOOD PRESSURE: 96 MMHG | WEIGHT: 136.03 LBS | OXYGEN SATURATION: 95 % | RESPIRATION RATE: 20 BRPM

## 2023-12-01 DIAGNOSIS — Z98.1 ARTHRODESIS STATUS: Chronic | ICD-10-CM

## 2023-12-01 LAB
ALBUMIN SERPL ELPH-MCNC: 4.7 G/DL — SIGNIFICANT CHANGE UP (ref 3.3–5)
ALBUMIN SERPL ELPH-MCNC: 4.7 G/DL — SIGNIFICANT CHANGE UP (ref 3.3–5)
ALP SERPL-CCNC: 77 U/L — SIGNIFICANT CHANGE UP (ref 40–120)
ALP SERPL-CCNC: 77 U/L — SIGNIFICANT CHANGE UP (ref 40–120)
ALT FLD-CCNC: 14 U/L — SIGNIFICANT CHANGE UP (ref 10–45)
ALT FLD-CCNC: 14 U/L — SIGNIFICANT CHANGE UP (ref 10–45)
ANION GAP SERPL CALC-SCNC: 11 MMOL/L — SIGNIFICANT CHANGE UP (ref 5–17)
ANION GAP SERPL CALC-SCNC: 11 MMOL/L — SIGNIFICANT CHANGE UP (ref 5–17)
ANISOCYTOSIS BLD QL: SLIGHT — SIGNIFICANT CHANGE UP
ANISOCYTOSIS BLD QL: SLIGHT — SIGNIFICANT CHANGE UP
APPEARANCE UR: CLEAR — SIGNIFICANT CHANGE UP
APPEARANCE UR: CLEAR — SIGNIFICANT CHANGE UP
AST SERPL-CCNC: 37 U/L — SIGNIFICANT CHANGE UP (ref 10–40)
AST SERPL-CCNC: 37 U/L — SIGNIFICANT CHANGE UP (ref 10–40)
BACTERIA # UR AUTO: NEGATIVE /HPF — SIGNIFICANT CHANGE UP
BACTERIA # UR AUTO: NEGATIVE /HPF — SIGNIFICANT CHANGE UP
BASE EXCESS BLDV CALC-SCNC: 4.1 MMOL/L — HIGH (ref -2–3)
BASE EXCESS BLDV CALC-SCNC: 4.1 MMOL/L — HIGH (ref -2–3)
BASOPHILS # BLD AUTO: 0 K/UL — SIGNIFICANT CHANGE UP (ref 0–0.2)
BASOPHILS # BLD AUTO: 0 K/UL — SIGNIFICANT CHANGE UP (ref 0–0.2)
BASOPHILS NFR BLD AUTO: 0 % — SIGNIFICANT CHANGE UP (ref 0–2)
BASOPHILS NFR BLD AUTO: 0 % — SIGNIFICANT CHANGE UP (ref 0–2)
BILIRUB SERPL-MCNC: 0.3 MG/DL — SIGNIFICANT CHANGE UP (ref 0.2–1.2)
BILIRUB SERPL-MCNC: 0.3 MG/DL — SIGNIFICANT CHANGE UP (ref 0.2–1.2)
BILIRUB UR-MCNC: NEGATIVE — SIGNIFICANT CHANGE UP
BILIRUB UR-MCNC: NEGATIVE — SIGNIFICANT CHANGE UP
BUN SERPL-MCNC: 15 MG/DL — SIGNIFICANT CHANGE UP (ref 7–23)
BUN SERPL-MCNC: 15 MG/DL — SIGNIFICANT CHANGE UP (ref 7–23)
CA-I SERPL-SCNC: 1.29 MMOL/L — SIGNIFICANT CHANGE UP (ref 1.15–1.33)
CA-I SERPL-SCNC: 1.29 MMOL/L — SIGNIFICANT CHANGE UP (ref 1.15–1.33)
CALCIUM SERPL-MCNC: 10.5 MG/DL — SIGNIFICANT CHANGE UP (ref 8.4–10.5)
CALCIUM SERPL-MCNC: 10.5 MG/DL — SIGNIFICANT CHANGE UP (ref 8.4–10.5)
CAST: 0 /LPF — SIGNIFICANT CHANGE UP (ref 0–4)
CAST: 0 /LPF — SIGNIFICANT CHANGE UP (ref 0–4)
CHLORIDE BLDV-SCNC: 100 MMOL/L — SIGNIFICANT CHANGE UP (ref 96–108)
CHLORIDE BLDV-SCNC: 100 MMOL/L — SIGNIFICANT CHANGE UP (ref 96–108)
CHLORIDE SERPL-SCNC: 98 MMOL/L — SIGNIFICANT CHANGE UP (ref 96–108)
CHLORIDE SERPL-SCNC: 98 MMOL/L — SIGNIFICANT CHANGE UP (ref 96–108)
CO2 BLDV-SCNC: 31 MMOL/L — HIGH (ref 22–26)
CO2 BLDV-SCNC: 31 MMOL/L — HIGH (ref 22–26)
CO2 SERPL-SCNC: 26 MMOL/L — SIGNIFICANT CHANGE UP (ref 22–31)
CO2 SERPL-SCNC: 26 MMOL/L — SIGNIFICANT CHANGE UP (ref 22–31)
COLOR SPEC: YELLOW — SIGNIFICANT CHANGE UP
COLOR SPEC: YELLOW — SIGNIFICANT CHANGE UP
CREAT SERPL-MCNC: 0.68 MG/DL — SIGNIFICANT CHANGE UP (ref 0.5–1.3)
CREAT SERPL-MCNC: 0.68 MG/DL — SIGNIFICANT CHANGE UP (ref 0.5–1.3)
DACRYOCYTES BLD QL SMEAR: SLIGHT — SIGNIFICANT CHANGE UP
DACRYOCYTES BLD QL SMEAR: SLIGHT — SIGNIFICANT CHANGE UP
DIFF PNL FLD: ABNORMAL
DIFF PNL FLD: ABNORMAL
EGFR: 111 ML/MIN/1.73M2 — SIGNIFICANT CHANGE UP
EGFR: 111 ML/MIN/1.73M2 — SIGNIFICANT CHANGE UP
EOSINOPHIL # BLD AUTO: 0.09 K/UL — SIGNIFICANT CHANGE UP (ref 0–0.5)
EOSINOPHIL # BLD AUTO: 0.09 K/UL — SIGNIFICANT CHANGE UP (ref 0–0.5)
EOSINOPHIL NFR BLD AUTO: 0.9 % — SIGNIFICANT CHANGE UP (ref 0–6)
EOSINOPHIL NFR BLD AUTO: 0.9 % — SIGNIFICANT CHANGE UP (ref 0–6)
GAS PNL BLDV: 135 MMOL/L — LOW (ref 136–145)
GAS PNL BLDV: 135 MMOL/L — LOW (ref 136–145)
GAS PNL BLDV: SIGNIFICANT CHANGE UP
GLUCOSE BLDV-MCNC: 94 MG/DL — SIGNIFICANT CHANGE UP (ref 70–99)
GLUCOSE BLDV-MCNC: 94 MG/DL — SIGNIFICANT CHANGE UP (ref 70–99)
GLUCOSE SERPL-MCNC: 108 MG/DL — HIGH (ref 70–99)
GLUCOSE SERPL-MCNC: 108 MG/DL — HIGH (ref 70–99)
GLUCOSE UR QL: NEGATIVE MG/DL — SIGNIFICANT CHANGE UP
GLUCOSE UR QL: NEGATIVE MG/DL — SIGNIFICANT CHANGE UP
HCG SERPL-ACNC: <2 MIU/ML — SIGNIFICANT CHANGE UP
HCG SERPL-ACNC: <2 MIU/ML — SIGNIFICANT CHANGE UP
HCO3 BLDV-SCNC: 30 MMOL/L — HIGH (ref 22–29)
HCO3 BLDV-SCNC: 30 MMOL/L — HIGH (ref 22–29)
HCT VFR BLD CALC: 39.7 % — SIGNIFICANT CHANGE UP (ref 34.5–45)
HCT VFR BLD CALC: 39.7 % — SIGNIFICANT CHANGE UP (ref 34.5–45)
HCT VFR BLDA CALC: 37 % — SIGNIFICANT CHANGE UP (ref 34.5–46.5)
HCT VFR BLDA CALC: 37 % — SIGNIFICANT CHANGE UP (ref 34.5–46.5)
HGB BLD CALC-MCNC: 12.3 G/DL — SIGNIFICANT CHANGE UP (ref 11.7–16.1)
HGB BLD CALC-MCNC: 12.3 G/DL — SIGNIFICANT CHANGE UP (ref 11.7–16.1)
HGB BLD-MCNC: 11.7 G/DL — SIGNIFICANT CHANGE UP (ref 11.5–15.5)
HGB BLD-MCNC: 11.7 G/DL — SIGNIFICANT CHANGE UP (ref 11.5–15.5)
KETONES UR-MCNC: NEGATIVE MG/DL — SIGNIFICANT CHANGE UP
KETONES UR-MCNC: NEGATIVE MG/DL — SIGNIFICANT CHANGE UP
LACTATE BLDV-MCNC: 1.2 MMOL/L — SIGNIFICANT CHANGE UP (ref 0.5–2)
LACTATE BLDV-MCNC: 1.2 MMOL/L — SIGNIFICANT CHANGE UP (ref 0.5–2)
LEUKOCYTE ESTERASE UR-ACNC: NEGATIVE — SIGNIFICANT CHANGE UP
LEUKOCYTE ESTERASE UR-ACNC: NEGATIVE — SIGNIFICANT CHANGE UP
LIDOCAIN IGE QN: 31 U/L — SIGNIFICANT CHANGE UP (ref 7–60)
LIDOCAIN IGE QN: 31 U/L — SIGNIFICANT CHANGE UP (ref 7–60)
LYMPHOCYTES # BLD AUTO: 1.75 K/UL — SIGNIFICANT CHANGE UP (ref 1–3.3)
LYMPHOCYTES # BLD AUTO: 1.75 K/UL — SIGNIFICANT CHANGE UP (ref 1–3.3)
LYMPHOCYTES # BLD AUTO: 16.8 % — SIGNIFICANT CHANGE UP (ref 13–44)
LYMPHOCYTES # BLD AUTO: 16.8 % — SIGNIFICANT CHANGE UP (ref 13–44)
MANUAL SMEAR VERIFICATION: SIGNIFICANT CHANGE UP
MANUAL SMEAR VERIFICATION: SIGNIFICANT CHANGE UP
MCHC RBC-ENTMCNC: 21.7 PG — LOW (ref 27–34)
MCHC RBC-ENTMCNC: 21.7 PG — LOW (ref 27–34)
MCHC RBC-ENTMCNC: 29.5 GM/DL — LOW (ref 32–36)
MCHC RBC-ENTMCNC: 29.5 GM/DL — LOW (ref 32–36)
MCV RBC AUTO: 73.7 FL — LOW (ref 80–100)
MCV RBC AUTO: 73.7 FL — LOW (ref 80–100)
MICROCYTES BLD QL: SLIGHT — SIGNIFICANT CHANGE UP
MICROCYTES BLD QL: SLIGHT — SIGNIFICANT CHANGE UP
MONOCYTES # BLD AUTO: 0.69 K/UL — SIGNIFICANT CHANGE UP (ref 0–0.9)
MONOCYTES # BLD AUTO: 0.69 K/UL — SIGNIFICANT CHANGE UP (ref 0–0.9)
MONOCYTES NFR BLD AUTO: 6.6 % — SIGNIFICANT CHANGE UP (ref 2–14)
MONOCYTES NFR BLD AUTO: 6.6 % — SIGNIFICANT CHANGE UP (ref 2–14)
NEUTROPHILS # BLD AUTO: 7.9 K/UL — HIGH (ref 1.8–7.4)
NEUTROPHILS # BLD AUTO: 7.9 K/UL — HIGH (ref 1.8–7.4)
NEUTROPHILS NFR BLD AUTO: 75.7 % — SIGNIFICANT CHANGE UP (ref 43–77)
NEUTROPHILS NFR BLD AUTO: 75.7 % — SIGNIFICANT CHANGE UP (ref 43–77)
NITRITE UR-MCNC: NEGATIVE — SIGNIFICANT CHANGE UP
NITRITE UR-MCNC: NEGATIVE — SIGNIFICANT CHANGE UP
OVALOCYTES BLD QL SMEAR: SLIGHT — SIGNIFICANT CHANGE UP
OVALOCYTES BLD QL SMEAR: SLIGHT — SIGNIFICANT CHANGE UP
PCO2 BLDV: 48 MMHG — HIGH (ref 39–42)
PCO2 BLDV: 48 MMHG — HIGH (ref 39–42)
PH BLDV: 7.4 — SIGNIFICANT CHANGE UP (ref 7.32–7.43)
PH BLDV: 7.4 — SIGNIFICANT CHANGE UP (ref 7.32–7.43)
PH UR: 6 — SIGNIFICANT CHANGE UP (ref 5–8)
PH UR: 6 — SIGNIFICANT CHANGE UP (ref 5–8)
PLAT MORPH BLD: NORMAL — SIGNIFICANT CHANGE UP
PLAT MORPH BLD: NORMAL — SIGNIFICANT CHANGE UP
PLATELET # BLD AUTO: 483 K/UL — HIGH (ref 150–400)
PLATELET # BLD AUTO: 483 K/UL — HIGH (ref 150–400)
PO2 BLDV: 46 MMHG — HIGH (ref 25–45)
PO2 BLDV: 46 MMHG — HIGH (ref 25–45)
POIKILOCYTOSIS BLD QL AUTO: SLIGHT — SIGNIFICANT CHANGE UP
POIKILOCYTOSIS BLD QL AUTO: SLIGHT — SIGNIFICANT CHANGE UP
POTASSIUM BLDV-SCNC: 3.6 MMOL/L — SIGNIFICANT CHANGE UP (ref 3.5–5.1)
POTASSIUM BLDV-SCNC: 3.6 MMOL/L — SIGNIFICANT CHANGE UP (ref 3.5–5.1)
POTASSIUM SERPL-MCNC: 4.5 MMOL/L — SIGNIFICANT CHANGE UP (ref 3.5–5.3)
POTASSIUM SERPL-MCNC: 4.5 MMOL/L — SIGNIFICANT CHANGE UP (ref 3.5–5.3)
POTASSIUM SERPL-SCNC: 4.5 MMOL/L — SIGNIFICANT CHANGE UP (ref 3.5–5.3)
POTASSIUM SERPL-SCNC: 4.5 MMOL/L — SIGNIFICANT CHANGE UP (ref 3.5–5.3)
PROT SERPL-MCNC: 8 G/DL — SIGNIFICANT CHANGE UP (ref 6–8.3)
PROT SERPL-MCNC: 8 G/DL — SIGNIFICANT CHANGE UP (ref 6–8.3)
PROT UR-MCNC: NEGATIVE MG/DL — SIGNIFICANT CHANGE UP
PROT UR-MCNC: NEGATIVE MG/DL — SIGNIFICANT CHANGE UP
RBC # BLD: 5.39 M/UL — HIGH (ref 3.8–5.2)
RBC # BLD: 5.39 M/UL — HIGH (ref 3.8–5.2)
RBC # FLD: 15.6 % — HIGH (ref 10.3–14.5)
RBC # FLD: 15.6 % — HIGH (ref 10.3–14.5)
RBC BLD AUTO: ABNORMAL
RBC BLD AUTO: ABNORMAL
RBC CASTS # UR COMP ASSIST: 152 /HPF — HIGH (ref 0–4)
RBC CASTS # UR COMP ASSIST: 152 /HPF — HIGH (ref 0–4)
SAO2 % BLDV: 67.3 % — SIGNIFICANT CHANGE UP (ref 67–88)
SAO2 % BLDV: 67.3 % — SIGNIFICANT CHANGE UP (ref 67–88)
SODIUM SERPL-SCNC: 135 MMOL/L — SIGNIFICANT CHANGE UP (ref 135–145)
SODIUM SERPL-SCNC: 135 MMOL/L — SIGNIFICANT CHANGE UP (ref 135–145)
SP GR SPEC: 1.02 — SIGNIFICANT CHANGE UP (ref 1–1.03)
SP GR SPEC: 1.02 — SIGNIFICANT CHANGE UP (ref 1–1.03)
SQUAMOUS # UR AUTO: 2 /HPF — SIGNIFICANT CHANGE UP (ref 0–5)
SQUAMOUS # UR AUTO: 2 /HPF — SIGNIFICANT CHANGE UP (ref 0–5)
TROPONIN T, HIGH SENSITIVITY RESULT: <6 NG/L — SIGNIFICANT CHANGE UP (ref 0–51)
TROPONIN T, HIGH SENSITIVITY RESULT: <6 NG/L — SIGNIFICANT CHANGE UP (ref 0–51)
UROBILINOGEN FLD QL: 0.2 MG/DL — SIGNIFICANT CHANGE UP (ref 0.2–1)
UROBILINOGEN FLD QL: 0.2 MG/DL — SIGNIFICANT CHANGE UP (ref 0.2–1)
WBC # BLD: 10.43 K/UL — SIGNIFICANT CHANGE UP (ref 3.8–10.5)
WBC # BLD: 10.43 K/UL — SIGNIFICANT CHANGE UP (ref 3.8–10.5)
WBC # FLD AUTO: 10.43 K/UL — SIGNIFICANT CHANGE UP (ref 3.8–10.5)
WBC # FLD AUTO: 10.43 K/UL — SIGNIFICANT CHANGE UP (ref 3.8–10.5)
WBC UR QL: 2 /HPF — SIGNIFICANT CHANGE UP (ref 0–5)
WBC UR QL: 2 /HPF — SIGNIFICANT CHANGE UP (ref 0–5)

## 2023-12-01 PROCEDURE — 76830 TRANSVAGINAL US NON-OB: CPT

## 2023-12-01 PROCEDURE — 36415 COLL VENOUS BLD VENIPUNCTURE: CPT

## 2023-12-01 PROCEDURE — 84295 ASSAY OF SERUM SODIUM: CPT

## 2023-12-01 PROCEDURE — 82947 ASSAY GLUCOSE BLOOD QUANT: CPT

## 2023-12-01 PROCEDURE — 85025 COMPLETE CBC W/AUTO DIFF WBC: CPT

## 2023-12-01 PROCEDURE — 80053 COMPREHEN METABOLIC PANEL: CPT

## 2023-12-01 PROCEDURE — 81001 URINALYSIS AUTO W/SCOPE: CPT

## 2023-12-01 PROCEDURE — 99285 EMERGENCY DEPT VISIT HI MDM: CPT | Mod: 25

## 2023-12-01 PROCEDURE — 76377 3D RENDER W/INTRP POSTPROCES: CPT

## 2023-12-01 PROCEDURE — 76830 TRANSVAGINAL US NON-OB: CPT | Mod: 26

## 2023-12-01 PROCEDURE — 84132 ASSAY OF SERUM POTASSIUM: CPT

## 2023-12-01 PROCEDURE — 85018 HEMOGLOBIN: CPT

## 2023-12-01 PROCEDURE — 82803 BLOOD GASES ANY COMBINATION: CPT

## 2023-12-01 PROCEDURE — 82435 ASSAY OF BLOOD CHLORIDE: CPT

## 2023-12-01 PROCEDURE — 99285 EMERGENCY DEPT VISIT HI MDM: CPT

## 2023-12-01 PROCEDURE — 82330 ASSAY OF CALCIUM: CPT

## 2023-12-01 PROCEDURE — 87086 URINE CULTURE/COLONY COUNT: CPT

## 2023-12-01 PROCEDURE — 84702 CHORIONIC GONADOTROPIN TEST: CPT

## 2023-12-01 PROCEDURE — 96374 THER/PROPH/DIAG INJ IV PUSH: CPT

## 2023-12-01 PROCEDURE — 76377 3D RENDER W/INTRP POSTPROCES: CPT | Mod: 26

## 2023-12-01 PROCEDURE — 93975 VASCULAR STUDY: CPT

## 2023-12-01 PROCEDURE — 93005 ELECTROCARDIOGRAM TRACING: CPT

## 2023-12-01 PROCEDURE — 84484 ASSAY OF TROPONIN QUANT: CPT

## 2023-12-01 PROCEDURE — 83605 ASSAY OF LACTIC ACID: CPT

## 2023-12-01 PROCEDURE — 83690 ASSAY OF LIPASE: CPT

## 2023-12-01 PROCEDURE — 93975 VASCULAR STUDY: CPT | Mod: 26

## 2023-12-01 PROCEDURE — 85014 HEMATOCRIT: CPT

## 2023-12-01 RX ORDER — ACETAMINOPHEN 500 MG
1000 TABLET ORAL ONCE
Refills: 0 | Status: COMPLETED | OUTPATIENT
Start: 2023-12-01 | End: 2023-12-01

## 2023-12-01 RX ADMIN — Medication 400 MILLIGRAM(S): at 16:04

## 2023-12-01 NOTE — ED PROVIDER NOTE - PHYSICAL EXAMINATION
GENERAL: Awake, alert, NAD  HEENT: NC/AT, moist mucous membranes, PERRL, EOMI  LUNGS: CTAB, no wheezes or crackles   CARDIAC: RRR, no m/r/g  ABDOMEN: Soft, Suprapubic tenderness to palpation, non distended, no rebound, no guarding  BACK: No midline spinal tenderness, no CVA tenderness  EXT: No edema, no calf tenderness, 2+ DP pulses bilaterally, no deformities.  NEURO: A&Ox3. Moving all extremities.  SKIN: Warm and dry. No rash.  PSYCH: Normal affect.

## 2023-12-01 NOTE — ED ADULT NURSE NOTE - NSFALLUNIVINTERV_ED_ALL_ED
Bed/Stretcher in lowest position, wheels locked, appropriate side rails in place/Call bell, personal items and telephone in reach/Instruct patient to call for assistance before getting out of bed/chair/stretcher/Non-slip footwear applied when patient is off stretcher/[place] to call system/Physically safe environment - no spills, clutter or unnecessary equipment/Purposeful proactive rounding/Room/bathroom lighting operational, light cord in reach

## 2023-12-01 NOTE — ED PROVIDER NOTE - PROGRESS NOTE DETAILS
Zoila Arteaga, PGY-2 DO:  Spoke with the patient who is refusing the repeat troponin. States that she does not think it is her heart. The patient informed by not getting the repeat troponin we could be missing acute coronary syndromes and that this could ultimately lead to death. The patient displays competence and is okay with this. Zoila Arteaga, PGY-2 DO:  Patient declined OB examination, patient is requesting to f/u with her physician tomorrow. Ob had shared decision making with the patient and okay with her going to her OB/GYN. Will D/c patient.

## 2023-12-01 NOTE — ED PROVIDER NOTE - NSICDXFAMILYHX_GEN_ALL_CORE_FT
Awake/Alert/Cooperative FAMILY HISTORY:  Mother  Still living? Unknown  Family history of heart attack, Age at diagnosis: Age Unknown

## 2023-12-01 NOTE — ED PROVIDER NOTE - PATIENT PORTAL LINK FT
done You can access the FollowMyHealth Patient Portal offered by [facility] by registering at the following website: [email or web address]. By joining [employer]’s FollowMyHealth portal, you will also be able to view your health information using other applications (apps) compatible with our system. You can access the FollowMyHealth Patient Portal offered by [facility] by registering at the following website: [email or web address]. By joining [employer]’s FollowMyHealth portal, you will also be able to view your health information using other applications (apps) compatible with our system. You can access the FollowMyHealth Patient Portal offered by [facility] by registering at the following website: [email or web address]. By joining [employer]’s FollowMyHealth portal, you will also be able to view your health information using other applications (apps) compatible with our system.

## 2023-12-01 NOTE — ED PROVIDER NOTE - CLINICAL SUMMARY MEDICAL DECISION MAKING FREE TEXT BOX
42-year-old female history of HTN presenting with chest pain and abdominal pain.  The patient reports that she is in a monogamous relationship and was not doing anything prior to the onset of pain.  The patient reports the pain came on suddenly.  The patient denies fevers, chills, headache, vision changes, nausea, vomiting, urinary symptoms, vaginal discharge.  VSS.  Patient mildly tachycardic, will reassess.  PE.  Suprapubic tenderness to palpation.     The differential is not limited to malpositioned IUD, urinary tract infection, will assess for ovarian cyst, ruptured ovarian cyst, time lower concern for pancreatitis, diverticulitis but will obtain CT and if transvaginal ultrasound is negative.  Will also obtain cardiac workup to rule out ACS, basic labs, chest x-ray, and give pain medication.  Dispo pending labs imaging and reassessment.

## 2023-12-01 NOTE — ED PROVIDER NOTE - NSFOLLOWUPINSTRUCTIONS_ED_ALL_ED_FT
Your IUD is not in the right location.  Please follow-up with your OB/GYN tomorrow to have this removed! Leaving it in the wrong position can result in severe damage.      Please return to the emergency department if you begin having heavy vaginal bleeding, or severe abdominal pain that is not better with pain medication.    You may take 650 mg acetaminophen or 600 mg Motrin every six hours as needed for pain.

## 2023-12-01 NOTE — ED PROVIDER NOTE - ATTENDING CONTRIBUTION TO CARE
Dr. Simmons: I have personally performed a face to face bedside history and physical examination of this patient. I have discussed the history, examination, review of systems, assessment and plan of management with the resident. I have reviewed the electronic medical record and amended it to reflect my history, review of systems, physical exam, assessment and plan.    Dr. Simmons: 42-year-old [place]ian female history of hypertension,  for 3 years, single mother of 2 daughters, currently sexually active with one partner no history of STIs,  dysfunctional uterine bleeding and had Mirena IUD placed 2 months ago, presenting with 3 days of lower abdominal denies nausea, vomiting, shortness of breath, constipation, diarrhea, dysuria, hematuria, vaginal bleeding. Patient had only 1 ultrasound immediately after placement of her IUD and has not had one since.  Has intermittent vaginal spotting only.  Today patient also noticed some chest tightness for few mins, no associated symptoms and no radiation so came in.  Parents have a history of CAD but in their 60s.    Gen: No acute distress  HEENT: Mucous membranes moist, pink conjunctivae, EOMI  CV: RRR, no clubbing/cyanosis/edema  Resp: CTAB  GI: Abdomen soft, bilateral lower abdominal tenderness to palpation, no rebound no guarding.    : No CVAT  Neuro: A&O x 3, moving all 4 extremities  MSK: No spine or joint tenderness to palpation  Skin: No rashes    Well-appearing patient presenting with lower abdominal pain status post IUD placement 2 months ago, will get ultrasound to assess IUD migration.  If ultrasound nondiagnostic will consider CT abdomen pelvis.  Chest pain symptoms atypical, ACS unlikely, however given family history and risk factors will get Trope x2 and reassess. Dr. Simmons: I have personally performed a face to face bedside history and physical examination of this patient. I have discussed the history, examination, review of systems, assessment and plan of management with the resident. I have reviewed the electronic medical record and amended it to reflect my history, review of systems, physical exam, assessment and plan.    Dr. Simmons: 42-year-old Qat[place] female history of hypertension,  for 3 years, single mother of 2 daughters, currently sexually active with one partner no history of STIs,  dysfunctional uterine bleeding and had Mirena IUD placed 2 months ago, presenting with 3 days of lower abdominal denies nausea, vomiting, shortness of breath, constipation, diarrhea, dysuria, hematuria, vaginal bleeding. Patient had only 1 ultrasound immediately after placement of her IUD and has not had one since.  Has intermittent vaginal spotting only.  Today patient also noticed some chest tightness for few mins, no associated symptoms and no radiation so came in.  Parents have a history of CAD but in their 60s.    Gen: No acute distress  HEENT: Mucous membranes moist, pink conjunctivae, EOMI  CV: RRR, no clubbing/cyanosis/edema  Resp: CTAB  GI: Abdomen soft, bilateral lower abdominal tenderness to palpation, no rebound no guarding.    : No CVAT  Neuro: A&O x 3, moving all 4 extremities  MSK: No spine or joint tenderness to palpation  Skin: No rashes    Well-appearing patient presenting with lower abdominal pain status post IUD placement 2 months ago, will get ultrasound to assess IUD migration.  If ultrasound nondiagnostic will consider CT abdomen pelvis.  Chest pain symptoms atypical, ACS unlikely, however given family history and risk factors will get Trope x2 and reassess. Dr. Simmons: I have personally performed a face to face bedside history and physical examination of this patient. I have discussed the history, examination, review of systems, assessment and plan of management with the resident. I have reviewed the electronic medical record and amended it to reflect my history, review of systems, physical exam, assessment and plan.    Dr. Simmons: 42-year-old Burkinan female history of hypertension,  for 3 years, single mother of 2 daughters, currently sexually active with one partner no history of STIs,  dysfunctional uterine bleeding and had Mirena IUD placed 2 months ago, presenting with 3 days of lower abdominal denies nausea, vomiting, shortness of breath, constipation, diarrhea, dysuria, hematuria, vaginal bleeding. Patient had only 1 ultrasound immediately after placement of her IUD and has not had one since.  Has intermittent vaginal spotting only.  Today patient also noticed some chest tightness for few mins, no associated symptoms and no radiation so came in.  Parents have a history of CAD but in their 60s.    Gen: No acute distress  HEENT: Mucous membranes moist, pink conjunctivae, EOMI  CV: RRR, no clubbing/cyanosis/edema  Resp: CTAB  GI: Abdomen soft, bilateral lower abdominal tenderness to palpation, no rebound no guarding.    : No CVAT  Neuro: A&O x 3, moving all 4 extremities  MSK: No spine or joint tenderness to palpation  Skin: No rashes    Well-appearing patient presenting with lower abdominal pain status post IUD placement 2 months ago, will get ultrasound to assess IUD migration.  If ultrasound nondiagnostic will consider CT abdomen pelvis.  Chest pain symptoms atypical, ACS unlikely, however given family history and risk factors will get Trope x2 and reassess.

## 2023-12-01 NOTE — ED PROVIDER NOTE - OBJECTIVE STATEMENT
42-year-old female history of HTN presenting with chest pain and abdominal pain.  The pain is localized to the suprapubic region.  The chest pain is localized to the mid sternum.  The patient reports that she has been having the symptoms for past 3 days.  The patient reports that she had 1 episode of chest pain today after she became a little nervous.  The patient reports that after talking to her daughters to calm down and her chest pain went away.  The patient recently had an IUD placed, Mojgan, secondary to having heavy vaginal bleeding.  The patient reports that since then her bleeding has gradually subsided to the point she is only having 3 days of normal bleeding with her periods.  The patient reports that she has  never had the symptoms before.  The patient states that she follows with a cardiologist last year and had a normal stress test.  The patient denies fevers, chills, headache, visual changes, nausea, vomiting, urinary symptoms, vaginal discharge.  The patient reports that she is in a monogamous relationship and was not doing anything prior to the onset of the pain.  The patient reports the pain came on suddenly.

## 2023-12-01 NOTE — CONSULT NOTE ADULT - ASSESSMENT
A/P:    SONIYA Block PGY-2 A/P:    SONIYA lBock PGY-2 Patient is a 43yo  LMP  presenting to the ED for chest pain and lower abdominal pain. On further questioning, patient's pelvic cramping began on the day her menses began, 3 days ago. TVUS significant for adenomyosis which is consistent with her h/o AUB and low lying, malpositioned IUD. On initiation of evaluation, patient states she has a reliable GYN that she has seen for >20yrs that she can see tomorrow in office. Patient kindly declining pelvic exam or any further intervention, including IUD removal in ED. Patient counseled that she likely requires IUD removal and replacement, she expressed understanding and wishes to have this performed with outpatient GYN.     - No acute intervention from GYN team in ED. This malpositioned IUD is not an emergency and can be managed by outpatient provider in a setting where new IUD can be placed, if patient desires.  - Bleeding precautions discussed with patient  - Recommended OTC Motrin/Tylenol for menstrual cramping  - Cleared for discharge to home from GYN perspective, rest of care per primary ED team    Discussed with GYN service attending, Dr. Sandeep Block PGY2 Patient is a 41yo  LMP  presenting to the ED for chest pain and lower abdominal pain. On further questioning, patient's pelvic cramping began on the day her menses began, 3 days ago. TVUS significant for adenomyosis which is consistent with her h/o AUB and low lying, malpositioned IUD. On initiation of evaluation, patient states she has a reliable GYN that she has seen for >20yrs that she can see tomorrow in office. Patient kindly declining pelvic exam or any further intervention, including IUD removal in ED. Patient counseled that she likely requires IUD removal and replacement, she expressed understanding and wishes to have this performed with outpatient GYN.     - No acute intervention from GYN team in ED. This malpositioned IUD is not an emergency and can be managed by outpatient provider in a setting where new IUD can be placed, if patient desires.  - Bleeding precautions discussed with patient  - Recommended OTC Motrin/Tylenol for menstrual cramping  - Cleared for discharge to home from GYN perspective, rest of care per primary ED team    Discussed with GYN service attending, Dr. Sandeep Block PGY2 Patient is a 41yo  LMP  presenting to the ED for chest pain and lower abdominal pain. On further questioning, patient's pelvic cramping began on the day her menses began, 3 days ago. TVUS significant for adenomyosis which is consistent with her h/o AUB and low lying, malpositioned IUD. On initiation of evaluation, patient states she has a reliable GYN that she has seen for >20yrs that she can see tomorrow in office. Patient kindly declining pelvic exam or any further intervention, including IUD removal in ED. Patient counseled that she likely requires IUD removal and replacement, she expressed understanding and wishes to have this performed with outpatient GYN.     - No acute intervention from GYN team in ED. This malpositioned IUD is not an emergency and can be managed by outpatient provider in a setting where new IUD can be placed, if patient desires.  - Please provide printed report of TVUS for patient to bring to outpatient GYN tomorrow  - Bleeding precautions discussed with patient  - Recommended OTC Motrin/Tylenol for menstrual cramping  - Cleared for discharge to home from GYN perspective, rest of care per primary ED team    Discussed with GYN service attending, Dr. Sandeep Block PGY2 Patient is a 43yo  LMP  presenting to the ED for chest pain and lower abdominal pain. On further questioning, patient's pelvic cramping began on the day her menses began, 3 days ago. TVUS significant for adenomyosis which is consistent with her h/o AUB and low lying, malpositioned IUD. On initiation of evaluation, patient states she has a reliable GYN that she has seen for >20yrs that she can see tomorrow in office. Patient kindly declining pelvic exam or any further intervention, including IUD removal in ED. Patient counseled that she likely requires IUD removal and replacement, she expressed understanding and wishes to have this performed with outpatient GYN.     - No acute intervention from GYN team in ED. This malpositioned IUD is not an emergency and can be managed by outpatient provider in a setting where new IUD can be placed, if patient desires.  - Please provide printed report of TVUS for patient to bring to outpatient GYN tomorrow  - Bleeding precautions discussed with patient  - Recommended OTC Motrin/Tylenol for menstrual cramping  - Cleared for discharge to home from GYN perspective, rest of care per primary ED team    Discussed with GYN service attending, Dr. Sandeep Block PGY2

## 2023-12-01 NOTE — ED ADULT NURSE NOTE - OBJECTIVE STATEMENT
42 y.o F BIB self p/w c/o suprapubic pain. A+Ox4. Pt states x3 days ago started having B/L sudden onset suprapubic pain, worsening w/ palpation. Also states had one episode of chest tightness today stating she was feeling "anxious", but subsided shortly after speaking w/ her kids. Recently had IUD placed in October w/ no complications, states had placed due to heavy menses. Endorsing "spotting", but no heavy bleeding or saturation of pads at this time. Denies any SOB, n/v/d, vision changes. Upon initial assessment, 8/10 pain w/ suprapubic palpation. LS clear B/L. NSR on cardiac monitor. PMH HLD, HTN. No other complaints at this time, daughter at bedside, safety maintained.

## 2023-12-01 NOTE — ED ADULT NURSE REASSESSMENT NOTE - NS ED NURSE REASSESS COMMENT FT1
Pt POC was to get rpt troponin 3 hrs after first troponin, when blood draw was going to be collected pt refused blood draw stating she wants to go home. MD Trinh notified of situation and went to go re-educate pt on reason for needing rpt, pt verbalized understanding and still refused. MD also notified pt of IUD displacement, was supposed to be see by OBGYN. Pt pulled out own IV stating she would like to leave. MD trinh safely discharged patient w/ out RN.

## 2023-12-01 NOTE — ED ADULT NURSE NOTE - CHIEF COMPLAINT QUOTE
c/o nasal congestion and bilateral ear fullness. Patient states was seen Friday and finished her Zpack today. chest pain

## 2023-12-01 NOTE — ED PROVIDER NOTE - DIFFERENTIAL DIAGNOSIS
ACS vs atypical chest pain  abdo pain - r/o migrated IUD vs other intraabdo pathology Differential Diagnosis

## 2023-12-01 NOTE — CONSULT NOTE ADULT - SUBJECTIVE AND OBJECTIVE BOX
Gyn Consult Note  DENISE OLSEN  42y  Female [id number]    HPI:      Name of GYN Physician:     OBHx:    GYNHx: Denies fibroids, cysts, endometriosis, STI's, Abnormal pap smears. Mirena IUD placed 2 mo ago.  PMH: HTN, HLD  PSH: h/o spinal fusion  Meds:  All: NKDA  Soc: Sexually active, monogamous    accepts blood    Physical Exam:   General: sitting comfortably in bed, NAD   CV: RRR  Lungs: CTAB  Back: No CVA tenderness  Abd: Soft, non-tender, non-distended.  Bowel sounds present.    :  No bleeding on pad.    External labia wnl.  Bimanual exam with no cervical motion tenderness, uterus wnl, adnexa non palpable b/l.  Cervix closed vs. Cervix dilated    cm   Speculum Exam: No active bleeding from os.  Physiologic discharge.    Ext: non-tender b/l, no edema     LABS:                              11.7   10.43 )-----------( 483      ( 01 Dec 2023 14:28 )             39.7         135  |  98  |  15  ----------------------------<  108<H>  4.5   |  26  |  0.68    Ca    10.5      01 Dec 2023 14:28    TPro  8.0  /  Alb  4.7  /  TBili  0.3  /  DBili  x   /  AST  37  /  ALT  14  /  AlkPhos  77  12    I&O's Detail      Urinalysis Basic - ( 01 Dec 2023 16:46 )    Color: Yellow / Appearance: Clear / S.023 / pH: x  Gluc: x / Ketone: Negative mg/dL  / Bili: Negative / Urobili: 0.2 mg/dL   Blood: x / Protein: Negative mg/dL / Nitrite: Negative   Leuk Esterase: Negative / RBC: 152 /HPF / WBC 2 /HPF   Sq Epi: x / Non Sq Epi: 2 /HPF / Bacteria: Negative /HPF        RADIOLOGY & ADDITIONAL STUDIES:  < from: US Transvaginal (23 @ 15:51) >  PROCEDURE DATE:  2023          INTERPRETATION:  CLINICAL INFORMATION: 42-year-old female with pelvic   pain. IUD placement in October.    LMP: 2023    COMPARISON: Pelvic ultrasound 10/22/2019    TECHNIQUE:  Endovaginal pelvic sonogram only. Color and Spectral Doppler was performed.    FINDINGS:  Uterus: 8.6 cm x 5.7 x 6.3 cm. The uterus is retroverted. There is diffusely heterogeneous echotexture of the myometrium with small foci of cystic change and vertical shadowing, compatible with adenomyosis. No discrete fibroid is delineated.  Endometrium: The endometrium is poorly delineated and the endometrial/myometrial interface is  ill-defined. Endometrial thickness cannot be accurately measured.    The IUD is visualized and is malpositioned. The stem is partially located within the endocervical canal. The IUD is tilted and it appears that the side arms are located within the lower uterine body, likely within the myometrium.    Right ovary: 3.4 cm x 3.0 cm x 2.9 cm. The right ovary contains a simple appearing cyst measuring 3.0 x 2.7 x 2.5 cm. Normal arterial and venous waveforms.  Left ovary: 2.3 cm x 1.4 cm x 1.3 cm The . left ovary is unremarkable, containing small follicles. Normal arterial and venous waveforms.    Fluid: There is a small amount of free fluid in the cul-de-sac of the pelvis.    IMPRESSION:    1. Diffusely heterogeneous echotexture of the myometrium, compatible with adenomyosis.  2. The endometrium cannot be clearly delineated on this examination. Endometrial pathology, including neoplasm, cannot be excluded in this setting. Advise further assessment as clinically warranted. Sonohysterogram could be performed for further assessment.  3. The IUD is  too low and is malpositioned. The stem is partially located in the endocervical canal and the side arms at the level of the lower uterine body likely intramyometrial in location.  4. The left ovary is unremarkable.  5. There is a 3.0 cm simple appearing cyst within the right ovary. This may represent a follicular cyst. Ultrasound follow-up in 6 weeks to assess for resolution is recommended.    Gynecologic consultation is suggested for further evaluation and   management of these findings.    --- End of Report ---   Gyn Consult Note  DENISE OLSEN  42y  Female [id number]    HPI:      Name of GYN Physician:     OBHx:    GYNHx: Denies fibroids, cysts, endometriosis, STI's, Abnormal pap smears. Mirena IUD placed 2 mo ago.  PMH: HTN, HLD  PSH: h/o spinal fusion  Meds:  All: NKDA  Soc: Sexually active, monogamous    accepts blood    Physical Exam:   General: sitting comfortably in bed, NAD   CV: RRR  Lungs: CTAB  Back: No CVA tenderness  Abd: Soft, non-tender, non-distended.  Bowel sounds present.    :  No bleeding on pad.    External labia wnl.  Bimanual exam with no cervical motion tenderness, uterus wnl, adnexa non palpable b/l.  Cervix closed vs. Cervix dilated    cm   Speculum Exam: No active bleeding from os.  Physiologic discharge.    Ext: non-tender b/l, no edema     LABS:                              11.7   10.43 )-----------( 483      ( 01 Dec 2023 14:28 )             39.7         135  |  98  |  15  ----------------------------<  108<H>  4.5   |  26  |  0.68    Ca    10.5      01 Dec 2023 14:28    TPro  8.0  /  Alb  4.7  /  TBili  0.3  /  DBili  x   /  AST  37  /  ALT  14  /  AlkPhos  77  12    I&O's Detail      Urinalysis Basic - ( 01 Dec 2023 16:46 )    Color: Yellow / Appearance: Clear / S.023 / pH: x  Gluc: x / Ketone: Negative mg/dL  / Bili: Negative / Urobili: 0.2 mg/dL   Blood: x / Protein: Negative mg/dL / Nitrite: Negative   Leuk Esterase: Negative / RBC: 152 /HPF / WBC 2 /HPF   Sq Epi: x / Non Sq Epi: 2 /HPF / Bacteria: Negative /HPF        RADIOLOGY & ADDITIONAL STUDIES:  < from: US Transvaginal (23 @ 15:51) >  PROCEDURE DATE:  2023          INTERPRETATION:  CLINICAL INFORMATION: 42-year-old female with pelvic   pain. IUD placement in October.    LMP: 2023    COMPARISON: Pelvic ultrasound 10/22/2019    TECHNIQUE:  Endovaginal pelvic sonogram only. Color and Spectral Doppler was performed.    FINDINGS:  Uterus: 8.6 cm x 5.7 x 6.3 cm. The uterus is retroverted. There is diffusely heterogeneous echotexture of the myometrium with small foci of cystic change and vertical shadowing, compatible with adenomyosis. No discrete fibroid is delineated.  Endometrium: The endometrium is poorly delineated and the endometrial/myometrial interface is  ill-defined. Endometrial thickness cannot be accurately measured.    The IUD is visualized and is malpositioned. The stem is partially located within the endocervical canal. The IUD is tilted and it appears that the side arms are located within the lower uterine body, likely within the myometrium.    Right ovary: 3.4 cm x 3.0 cm x 2.9 cm. The right ovary contains a simple appearing cyst measuring 3.0 x 2.7 x 2.5 cm. Normal arterial and venous waveforms.  Left ovary: 2.3 cm x 1.4 cm x 1.3 cm The . left ovary is unremarkable, containing small follicles. Normal arterial and venous waveforms.    Fluid: There is a small amount of free fluid in the cul-de-sac of the pelvis.    IMPRESSION:    1. Diffusely heterogeneous echotexture of the myometrium, compatible with adenomyosis.  2. The endometrium cannot be clearly delineated on this examination. Endometrial pathology, including neoplasm, cannot be excluded in this setting. Advise further assessment as clinically warranted. Sonohysterogram could be performed for further assessment.  3. The IUD is  too low and is malpositioned. The stem is partially located in the endocervical canal and the side arms at the level of the lower uterine body likely intramyometrial in location.  4. The left ovary is unremarkable.  5. There is a 3.0 cm simple appearing cyst within the right ovary. This may represent a follicular cyst. Ultrasound follow-up in 6 weeks to assess for resolution is recommended.    Gynecologic consultation is suggested for further evaluation and   management of these findings.    --- End of Report ---   Gyn Consult Note  DENISE OLSEN  42y  Female [id number]    HPI:      Name of GYN Physician:     OBHx:    GYNHx: Denies fibroids, cysts, endometriosis, STI's, Abnormal pap smears. Mirena IUD placed 2 mo ago.  PMH: HTN, HLD  PSH: h/o spinal fusion  Meds:  All: NKDA  Soc: Sexually active, monogamous    accepts blood    Physical Exam:   General: sitting comfortably in bed, NAD   CV: RRR  Lungs: CTAB  Back: No CVA tenderness  Abd: Soft, non-tender, non-distended.  Bowel sounds present.    :  No bleeding on pad.    External labia wnl.  Bimanual exam with no cervical motion tenderness, uterus wnl, adnexa non palpable b/l.  Cervix closed vs. Cervix dilated    cm   Speculum Exam: No active bleeding from os.  Physiologic discharge.    Ext: non-tender b/l, no edema     LABS:                              11.7   10.43 )-----------( 483      ( 01 Dec 2023 14:28 )             39.7         135  |  98  |  15  ----------------------------<  108<H>  4.5   |  26  |  0.68    Ca    10.5      01 Dec 2023 14:28    TPro  8.0  /  Alb  4.7  /  TBili  0.3  /  DBili  x   /  AST  37  /  ALT  14  /  AlkPhos  77  12    I&O's Detail      Urinalysis Basic - ( 01 Dec 2023 16:46 )    Color: Yellow / Appearance: Clear / S.023 / pH: x  Gluc: x / Ketone: Negative mg/dL  / Bili: Negative / Urobili: 0.2 mg/dL   Blood: x / Protein: Negative mg/dL / Nitrite: Negative   Leuk Esterase: Negative / RBC: 152 /HPF / WBC 2 /HPF   Sq Epi: x / Non Sq Epi: 2 /HPF / Bacteria: Negative /HPF        RADIOLOGY & ADDITIONAL STUDIES:  < from: US Transvaginal (23 @ 15:51) >  PROCEDURE DATE:  2023          INTERPRETATION:  CLINICAL INFORMATION: 42-year-old female with pelvic   pain. IUD placement in October.    LMP: 2023    COMPARISON: Pelvic ultrasound 10/22/2019    TECHNIQUE:  Endovaginal pelvic sonogram only. Color and Spectral Doppler was performed.    FINDINGS:  Uterus: 8.6 cm x 5.7 x 6.3 cm. The uterus is retroverted. There is diffusely heterogeneous echotexture of the myometrium with small foci of cystic change and vertical shadowing, compatible with adenomyosis. No discrete fibroid is delineated.  Endometrium: The endometrium is poorly delineated and the endometrial/myometrial interface is  ill-defined. Endometrial thickness cannot be accurately measured.    The IUD is visualized and is malpositioned. The stem is partially located within the endocervical canal. The IUD is tilted and it appears that the side arms are located within the lower uterine body, likely within the myometrium.    Right ovary: 3.4 cm x 3.0 cm x 2.9 cm. The right ovary contains a simple appearing cyst measuring 3.0 x 2.7 x 2.5 cm. Normal arterial and venous waveforms.  Left ovary: 2.3 cm x 1.4 cm x 1.3 cm The . left ovary is unremarkable, containing small follicles. Normal arterial and venous waveforms.    Fluid: There is a small amount of free fluid in the cul-de-sac of the pelvis.    IMPRESSION:    1. Diffusely heterogeneous echotexture of the myometrium, compatible with adenomyosis.  2. The endometrium cannot be clearly delineated on this examination. Endometrial pathology, including neoplasm, cannot be excluded in this setting. Advise further assessment as clinically warranted. Sonohysterogram could be performed for further assessment.  3. The IUD is  too low and is malpositioned. The stem is partially located in the endocervical canal and the side arms at the level of the lower uterine body likely intramyometrial in location.  4. The left ovary is unremarkable.  5. There is a 3.0 cm simple appearing cyst within the right ovary. This may represent a follicular cyst. Ultrasound follow-up in 6 weeks to assess for resolution is recommended.    Gynecologic consultation is suggested for further evaluation and   management of these findings.    --- End of Report ---   Gyn Consult Note  DENISE OLSEN  42y  Female [id number]    HPI: Patient is a 41yo  LMP  presenting to the ED for chest pain and lower abdominal pain. Pt states pelvic cramping began 3 days ago at the same time as the onset of menses. She had a Mirena IUD placed on 10/2 for history of AUB, states this is her first menses since IUD placement. Has not tried any OTC pain medications at home. Reports her vaginal bleeding since onset of this period is light, using approx 2 pads per day. She has had a Mirena IUD approx 8-10 years ago with improvement in AUB symptoms.    Name of GYN Physician: Dr. Mikayla Williamson    OBHx: NSVDx2, SABx1  GYNHx: AUB, Denies fibroids, cysts, endometriosis, STI's, Abnormal pap smears. 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  General: sitting comfortably in bed, NAD   CV: extremities well perfused  Lungs: normal respiratory effort on room air  Abd: Soft, non-tender, non-distended  : patient declined pelvic exam  Ext: non-tender b/l, no edema     LABS:                        11.7   10.43 )-----------( 483      ( 01 Dec 2023 14:28 )             39.7     12    135  |  98  |  15  ----------------------------<  108<H>  4.5   |  26  |  0.68    Ca    10.5      01 Dec 2023 14:28    TPro  8.0  /  Alb  4.7  /  TBili  0.3  /  DBili  x   /  AST  37  /  ALT  14  /  AlkPhos  77  12    Urinalysis Basic - ( 01 Dec 2023 16:46 )  Color: Yellow / Appearance: Clear / S.023 / pH: x  Gluc: x / Ketone: Negative mg/dL  / Bili: Negative / Urobili: 0.2 mg/dL   Blood: x / Protein: Negative mg/dL / Nitrite: Negative   Leuk Esterase: Negative / RBC: 152 /HPF / WBC 2 /HPF   Sq Epi: x / Non Sq Epi: 2 /HPF / Bacteria: Negative /HPF    RADIOLOGY & ADDITIONAL STUDIES:  < from: US Transvaginal (23 @ 15:51) >  PROCEDURE DATE:  2023      INTERPRETATION:  CLINICAL INFORMATION: 42-year-old female with pelvic   pain. IUD placement in October.    LMP: 2023    COMPARISON: Pelvic ultrasound 10/22/2019    TECHNIQUE:  Endovaginal pelvic sonogram only. Color and Spectral Doppler was performed.    FINDINGS:  Uterus: 8.6 cm x 5.7 x 6.3 cm. The uterus is retroverted. There is diffusely heterogeneous echotexture of the myometrium with small foci of cystic change and vertical shadowing, compatible with adenomyosis. No discrete fibroid is delineated.  Endometrium: The endometrium is poorly delineated and the endometrial/myometrial interface is  ill-defined. Endometrial thickness cannot be accurately measured.    The IUD is visualized and is malpositioned. The stem is partially located within the endocervical canal. The IUD is tilted and it appears that the side arms are located within the lower uterine body, likely within the myometrium.    Right ovary: 3.4 cm x 3.0 cm x 2.9 cm. The right ovary contains a simple appearing cyst measuring 3.0 x 2.7 x 2.5 cm. Normal arterial and venous waveforms.  Left ovary: 2.3 cm x 1.4 cm x 1.3 cm The . left ovary is unremarkable, containing small follicles. Normal arterial and venous waveforms.    Fluid: There is a small amount of free fluid in the cul-de-sac of the pelvis.    IMPRESSION:    1. Diffusely heterogeneous echotexture of the myometrium, compatible with adenomyosis.  2. The endometrium cannot be clearly delineated on this examination. Endometrial pathology, including neoplasm, cannot be excluded in this setting. Advise further assessment as clinically warranted. Sonohysterogram could be performed for further assessment.  3. The IUD is  too low and is malpositioned. The stem is partially located in the endocervical canal and the side arms at the level of the lower uterine body likely intramyometrial in location.  4. The left ovary is unremarkable.  5. There is a 3.0 cm simple appearing cyst within the right ovary. This may represent a follicular cyst. Ultrasound follow-up in 6 weeks to assess for resolution is recommended.    Gynecologic consultation is suggested for further evaluation and   management of these findings.    --- End of Report ---   Gyn Consult Note  DENISE OLSEN  42y  Female [id number]    HPI: Patient is a 43yo  LMP  presenting to the ED for chest pain and lower abdominal pain. Pt states pelvic cramping began 3 days ago at the same time as the onset of menses. She had a Mirena IUD placed on 10/2 for history of AUB, states this is her first menses since IUD placement. Has not tried any OTC pain medications at home. Reports her vaginal bleeding since onset of this period is light, using approx 2 pads per day. She has had a Mirena IUD approx 8-10 years ago with improvement in AUB symptoms.    Name of GYN Physician: Dr. Mikayla Williamson    OBHx: NSVDx2, SABx1  GYNHx: AUB, Denies fibroids, cysts, endometriosis, STI's, Abnormal pap smears. 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  General: sitting comfortably in bed, NAD   CV: extremities well perfused  Lungs: normal respiratory effort on room air  Abd: Soft, non-tender, non-distended  : patient declined pelvic exam  Ext: non-tender b/l, no edema     LABS:                        11.7   10.43 )-----------( 483      ( 01 Dec 2023 14:28 )             39.7     12    135  |  98  |  15  ----------------------------<  108<H>  4.5   |  26  |  0.68    Ca    10.5      01 Dec 2023 14:28    TPro  8.0  /  Alb  4.7  /  TBili  0.3  /  DBili  x   /  AST  37  /  ALT  14  /  AlkPhos  77  12    Urinalysis Basic - ( 01 Dec 2023 16:46 )  Color: Yellow / Appearance: Clear / S.023 / pH: x  Gluc: x / Ketone: Negative mg/dL  / Bili: Negative / Urobili: 0.2 mg/dL   Blood: x / Protein: Negative mg/dL / Nitrite: Negative   Leuk Esterase: Negative / RBC: 152 /HPF / WBC 2 /HPF   Sq Epi: x / Non Sq Epi: 2 /HPF / Bacteria: Negative /HPF    RADIOLOGY & ADDITIONAL STUDIES:  < from: US Transvaginal (23 @ 15:51) >  PROCEDURE DATE:  2023      INTERPRETATION:  CLINICAL INFORMATION: 42-year-old female with pelvic   pain. IUD placement in October.    LMP: 2023    COMPARISON: Pelvic ultrasound 10/22/2019    TECHNIQUE:  Endovaginal pelvic sonogram only. Color and Spectral Doppler was performed.    FINDINGS:  Uterus: 8.6 cm x 5.7 x 6.3 cm. The uterus is retroverted. There is diffusely heterogeneous echotexture of the myometrium with small foci of cystic change and vertical shadowing, compatible with adenomyosis. No discrete fibroid is delineated.  Endometrium: The endometrium is poorly delineated and the endometrial/myometrial interface is  ill-defined. Endometrial thickness cannot be accurately measured.    The IUD is visualized and is malpositioned. The stem is partially located within the endocervical canal. The IUD is tilted and it appears that the side arms are located within the lower uterine body, likely within the myometrium.    Right ovary: 3.4 cm x 3.0 cm x 2.9 cm. The right ovary contains a simple appearing cyst measuring 3.0 x 2.7 x 2.5 cm. Normal arterial and venous waveforms.  Left ovary: 2.3 cm x 1.4 cm x 1.3 cm The . left ovary is unremarkable, containing small follicles. Normal arterial and venous waveforms.    Fluid: There is a small amount of free fluid in the cul-de-sac of the pelvis.    IMPRESSION:    1. Diffusely heterogeneous echotexture of the myometrium, compatible with adenomyosis.  2. The endometrium cannot be clearly delineated on this examination. Endometrial pathology, including neoplasm, cannot be excluded in this setting. Advise further assessment as clinically warranted. Sonohysterogram could be performed for further assessment.  3. The IUD is  too low and is malpositioned. The stem is partially located in the endocervical canal and the side arms at the level of the lower uterine body likely intramyometrial in location.  4. The left ovary is unremarkable.  5. There is a 3.0 cm simple appearing cyst within the right ovary. This may represent a follicular cyst. Ultrasound follow-up in 6 weeks to assess for resolution is recommended.    Gynecologic consultation is suggested for further evaluation and   management of these findings.    --- End of Report ---   Gyn Consult Note  DENISE OLSEN  42y  Female [id number]    HPI: Patient is a 41yo  LMP  presenting to the ED for chest pain and lower abdominal pain. Pt states pelvic cramping began 3 days ago at the same time as the onset of menses. She had a Mirena IUD placed on 10/2 for history of AUB, states this is her first menses since IUD placement. Has not tried any OTC pain medications at home. Reports her vaginal bleeding since onset of this period is light, using approx 2 pads per day. She has had a Mirena IUD approx 8-10 years ago with improvement in AUB symptoms.    Name of GYN Physician: Dr. Mikayla Williamson    OBHx: NSVDx2, SABx1  GYNHx: AUB, Denies fibroids, cysts, endometriosis, STI's, Abnormal pap smears. 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  General: sitting comfortably in bed, NAD   CV: extremities well perfused  Lungs: normal respiratory effort on room air  Abd: Soft, non-tender, non-distended  : patient declined pelvic exam  Ext: non-tender b/l, no edema     LABS:                        11.7   10.43 )-----------( 483      ( 01 Dec 2023 14:28 )             39.7     12    135  |  98  |  15  ----------------------------<  108<H>  4.5   |  26  |  0.68    Ca    10.5      01 Dec 2023 14:28    TPro  8.0  /  Alb  4.7  /  TBili  0.3  /  DBili  x   /  AST  37  /  ALT  14  /  AlkPhos  77  12    Urinalysis Basic - ( 01 Dec 2023 16:46 )  Color: Yellow / Appearance: Clear / S.023 / pH: x  Gluc: x / Ketone: Negative mg/dL  / Bili: Negative / Urobili: 0.2 mg/dL   Blood: x / Protein: Negative mg/dL / Nitrite: Negative   Leuk Esterase: Negative / RBC: 152 /HPF / WBC 2 /HPF   Sq Epi: x / Non Sq Epi: 2 /HPF / Bacteria: Negative /HPF    RADIOLOGY & ADDITIONAL STUDIES:  < from: US Transvaginal (23 @ 15:51) >  PROCEDURE DATE:  2023      INTERPRETATION:  CLINICAL INFORMATION: 42-year-old female with pelvic   pain. IUD placement in October.    LMP: 2023    COMPARISON: Pelvic ultrasound 10/22/2019    TECHNIQUE:  Endovaginal pelvic sonogram only. Color and Spectral Doppler was performed.    FINDINGS:  Uterus: 8.6 cm x 5.7 x 6.3 cm. The uterus is retroverted. There is diffusely heterogeneous echotexture of the myometrium with small foci of cystic change and vertical shadowing, compatible with adenomyosis. No discrete fibroid is delineated.  Endometrium: The endometrium is poorly delineated and the endometrial/myometrial interface is  ill-defined. Endometrial thickness cannot be accurately measured.    The IUD is visualized and is malpositioned. The stem is partially located within the endocervical canal. The IUD is tilted and it appears that the side arms are located within the lower uterine body, likely within the myometrium.    Right ovary: 3.4 cm x 3.0 cm x 2.9 cm. The right ovary contains a simple appearing cyst measuring 3.0 x 2.7 x 2.5 cm. Normal arterial and venous waveforms.  Left ovary: 2.3 cm x 1.4 cm x 1.3 cm The . left ovary is unremarkable, containing small follicles. Normal arterial and venous waveforms.    Fluid: There is a small amount of free fluid in the cul-de-sac of the pelvis.    IMPRESSION:    1. Diffusely heterogeneous echotexture of the myometrium, compatible with adenomyosis.  2. The endometrium cannot be clearly delineated on this examination. Endometrial pathology, including neoplasm, cannot be excluded in this setting. Advise further assessment as clinically warranted. Sonohysterogram could be performed for further assessment.  3. The IUD is  too low and is malpositioned. The stem is partially located in the endocervical canal and the side arms at the level of the lower uterine body likely intramyometrial in location.  4. The left ovary is unremarkable.  5. There is a 3.0 cm simple appearing cyst within the right ovary. This may represent a follicular cyst. Ultrasound follow-up in 6 weeks to assess for resolution is recommended.    Gynecologic consultation is suggested for further evaluation and   management of these findings.    --- End of Report ---

## 2023-12-02 NOTE — ED POST DISCHARGE NOTE - DETAILS
Called patient to specifically ensure pt aware of poorly visualized endometrium and rads recommendation of f/u Sonohysterogram to exclude pathology. - Zaira Khanna PA-C 12/3/23: No answer, left voice message for pt to call back admin line. - Khang Eric PA-C 12/04: pt contacted, understands incidental, saw gyn today. -Janine Rachel PA-C

## 2023-12-03 LAB
CULTURE RESULTS: SIGNIFICANT CHANGE UP
CULTURE RESULTS: SIGNIFICANT CHANGE UP
SPECIMEN SOURCE: SIGNIFICANT CHANGE UP
SPECIMEN SOURCE: SIGNIFICANT CHANGE UP

## 2024-06-21 ENCOUNTER — EMERGENCY (EMERGENCY)
Facility: HOSPITAL | Age: 44
LOS: 1 days | Discharge: HOME | End: 2024-06-21
Attending: EMERGENCY MEDICINE
Payer: COMMERCIAL

## 2024-06-21 VITALS
DIASTOLIC BLOOD PRESSURE: 90 MMHG | WEIGHT: 138.01 LBS | RESPIRATION RATE: 18 BRPM | HEIGHT: 61 IN | OXYGEN SATURATION: 100 % | SYSTOLIC BLOOD PRESSURE: 139 MMHG | TEMPERATURE: 98 F | HEART RATE: 95 BPM

## 2024-06-21 DIAGNOSIS — Z98.1 ARTHRODESIS STATUS: Chronic | ICD-10-CM

## 2024-06-21 LAB
ALBUMIN SERPL ELPH-MCNC: 4.3 G/DL — SIGNIFICANT CHANGE UP (ref 3.3–5)
ALP SERPL-CCNC: 73 U/L — SIGNIFICANT CHANGE UP (ref 40–120)
ALT FLD-CCNC: 18 U/L — SIGNIFICANT CHANGE UP (ref 10–45)
ANION GAP SERPL CALC-SCNC: 11 MMOL/L — SIGNIFICANT CHANGE UP (ref 5–17)
APPEARANCE UR: CLEAR — SIGNIFICANT CHANGE UP
APTT BLD: 26.8 SEC — SIGNIFICANT CHANGE UP (ref 24.5–35.6)
AST SERPL-CCNC: 17 U/L — SIGNIFICANT CHANGE UP (ref 10–40)
BACTERIA # UR AUTO: NEGATIVE /HPF — SIGNIFICANT CHANGE UP
BASE EXCESS BLDV CALC-SCNC: 0.9 MMOL/L — SIGNIFICANT CHANGE UP (ref -2–3)
BASOPHILS # BLD AUTO: 0.05 K/UL — SIGNIFICANT CHANGE UP (ref 0–0.2)
BASOPHILS NFR BLD AUTO: 0.7 % — SIGNIFICANT CHANGE UP (ref 0–2)
BILIRUB SERPL-MCNC: 0.2 MG/DL — SIGNIFICANT CHANGE UP (ref 0.2–1.2)
BILIRUB UR-MCNC: NEGATIVE — SIGNIFICANT CHANGE UP
BUN SERPL-MCNC: 8 MG/DL — SIGNIFICANT CHANGE UP (ref 7–23)
CA-I SERPL-SCNC: 1.4 MMOL/L — HIGH (ref 1.15–1.33)
CALCIUM SERPL-MCNC: 10.2 MG/DL — SIGNIFICANT CHANGE UP (ref 8.4–10.5)
CAST: 1 /LPF — SIGNIFICANT CHANGE UP (ref 0–4)
CHLORIDE BLDV-SCNC: 107 MMOL/L — SIGNIFICANT CHANGE UP (ref 96–108)
CHLORIDE SERPL-SCNC: 109 MMOL/L — HIGH (ref 96–108)
CO2 BLDV-SCNC: 29 MMOL/L — HIGH (ref 22–26)
CO2 SERPL-SCNC: 22 MMOL/L — SIGNIFICANT CHANGE UP (ref 22–31)
COD CRY URNS QL: PRESENT
COLOR SPEC: YELLOW — SIGNIFICANT CHANGE UP
CREAT SERPL-MCNC: 0.7 MG/DL — SIGNIFICANT CHANGE UP (ref 0.5–1.3)
DIFF PNL FLD: NEGATIVE — SIGNIFICANT CHANGE UP
EGFR: 110 ML/MIN/1.73M2 — SIGNIFICANT CHANGE UP
EOSINOPHIL # BLD AUTO: 0.17 K/UL — SIGNIFICANT CHANGE UP (ref 0–0.5)
EOSINOPHIL NFR BLD AUTO: 2.4 % — SIGNIFICANT CHANGE UP (ref 0–6)
GAS PNL BLDV: 138 MMOL/L — SIGNIFICANT CHANGE UP (ref 136–145)
GAS PNL BLDV: SIGNIFICANT CHANGE UP
GAS PNL BLDV: SIGNIFICANT CHANGE UP
GLUCOSE BLDV-MCNC: 84 MG/DL — SIGNIFICANT CHANGE UP (ref 70–99)
GLUCOSE SERPL-MCNC: 91 MG/DL — SIGNIFICANT CHANGE UP (ref 70–99)
GLUCOSE UR QL: NEGATIVE MG/DL — SIGNIFICANT CHANGE UP
HCG SERPL-ACNC: <2 MIU/ML — SIGNIFICANT CHANGE UP
HCO3 BLDV-SCNC: 28 MMOL/L — SIGNIFICANT CHANGE UP (ref 22–29)
HCT VFR BLD CALC: 40.1 % — SIGNIFICANT CHANGE UP (ref 34.5–45)
HCT VFR BLDA CALC: 39 % — SIGNIFICANT CHANGE UP (ref 34.5–46.5)
HGB BLD CALC-MCNC: 13.1 G/DL — SIGNIFICANT CHANGE UP (ref 11.7–16.1)
HGB BLD-MCNC: 12.6 G/DL — SIGNIFICANT CHANGE UP (ref 11.5–15.5)
IMM GRANULOCYTES NFR BLD AUTO: 0.6 % — SIGNIFICANT CHANGE UP (ref 0–0.9)
INR BLD: 1.04 RATIO — SIGNIFICANT CHANGE UP (ref 0.85–1.18)
KETONES UR-MCNC: ABNORMAL MG/DL
LACTATE BLDV-MCNC: 1.4 MMOL/L — SIGNIFICANT CHANGE UP (ref 0.5–2)
LEUKOCYTE ESTERASE UR-ACNC: NEGATIVE — SIGNIFICANT CHANGE UP
LIDOCAIN IGE QN: 56 U/L — SIGNIFICANT CHANGE UP (ref 7–60)
LYMPHOCYTES # BLD AUTO: 2.02 K/UL — SIGNIFICANT CHANGE UP (ref 1–3.3)
LYMPHOCYTES # BLD AUTO: 28.6 % — SIGNIFICANT CHANGE UP (ref 13–44)
MCHC RBC-ENTMCNC: 26 PG — LOW (ref 27–34)
MCHC RBC-ENTMCNC: 31.4 GM/DL — LOW (ref 32–36)
MCV RBC AUTO: 82.7 FL — SIGNIFICANT CHANGE UP (ref 80–100)
MONOCYTES # BLD AUTO: 0.58 K/UL — SIGNIFICANT CHANGE UP (ref 0–0.9)
MONOCYTES NFR BLD AUTO: 8.2 % — SIGNIFICANT CHANGE UP (ref 2–14)
NEUTROPHILS # BLD AUTO: 4.2 K/UL — SIGNIFICANT CHANGE UP (ref 1.8–7.4)
NEUTROPHILS NFR BLD AUTO: 59.5 % — SIGNIFICANT CHANGE UP (ref 43–77)
NITRITE UR-MCNC: NEGATIVE — SIGNIFICANT CHANGE UP
NRBC # BLD: 0 /100 WBCS — SIGNIFICANT CHANGE UP (ref 0–0)
PCO2 BLDV: 51 MMHG — HIGH (ref 39–42)
PH BLDV: 7.34 — SIGNIFICANT CHANGE UP (ref 7.32–7.43)
PH UR: 6 — SIGNIFICANT CHANGE UP (ref 5–8)
PLATELET # BLD AUTO: 256 K/UL — SIGNIFICANT CHANGE UP (ref 150–400)
PO2 BLDV: 22 MMHG — LOW (ref 25–45)
POTASSIUM BLDV-SCNC: 3.9 MMOL/L — SIGNIFICANT CHANGE UP (ref 3.5–5.1)
POTASSIUM SERPL-MCNC: 4 MMOL/L — SIGNIFICANT CHANGE UP (ref 3.5–5.3)
POTASSIUM SERPL-SCNC: 4 MMOL/L — SIGNIFICANT CHANGE UP (ref 3.5–5.3)
PROT SERPL-MCNC: 7.3 G/DL — SIGNIFICANT CHANGE UP (ref 6–8.3)
PROT UR-MCNC: NEGATIVE MG/DL — SIGNIFICANT CHANGE UP
PROTHROM AB SERPL-ACNC: 11.4 SEC — SIGNIFICANT CHANGE UP (ref 9.5–13)
RBC # BLD: 4.85 M/UL — SIGNIFICANT CHANGE UP (ref 3.8–5.2)
RBC # FLD: 15.5 % — HIGH (ref 10.3–14.5)
RBC CASTS # UR COMP ASSIST: 0 /HPF — SIGNIFICANT CHANGE UP (ref 0–4)
REVIEW: SIGNIFICANT CHANGE UP
SAO2 % BLDV: 36.5 % — LOW (ref 67–88)
SODIUM SERPL-SCNC: 142 MMOL/L — SIGNIFICANT CHANGE UP (ref 135–145)
SP GR SPEC: 1.02 — SIGNIFICANT CHANGE UP (ref 1–1.03)
SQUAMOUS # UR AUTO: 0 /HPF — SIGNIFICANT CHANGE UP (ref 0–5)
UROBILINOGEN FLD QL: 0.2 MG/DL — SIGNIFICANT CHANGE UP (ref 0.2–1)
WBC # BLD: 7.06 K/UL — SIGNIFICANT CHANGE UP (ref 3.8–10.5)
WBC # FLD AUTO: 7.06 K/UL — SIGNIFICANT CHANGE UP (ref 3.8–10.5)
WBC UR QL: 1 /HPF — SIGNIFICANT CHANGE UP (ref 0–5)

## 2024-06-21 PROCEDURE — 74177 CT ABD & PELVIS W/CONTRAST: CPT | Mod: 26,MC

## 2024-06-21 PROCEDURE — 99285 EMERGENCY DEPT VISIT HI MDM: CPT

## 2024-06-21 RX ORDER — ONDANSETRON 8 MG/1
4 TABLET, FILM COATED ORAL ONCE
Refills: 0 | Status: COMPLETED | OUTPATIENT
Start: 2024-06-21 | End: 2024-06-21

## 2024-06-21 RX ORDER — ACETAMINOPHEN 500 MG
1000 TABLET ORAL ONCE
Refills: 0 | Status: COMPLETED | OUTPATIENT
Start: 2024-06-21 | End: 2024-06-21

## 2024-06-21 RX ORDER — SODIUM CHLORIDE 9 MG/ML
1000 INJECTION INTRAMUSCULAR; INTRAVENOUS; SUBCUTANEOUS ONCE
Refills: 0 | Status: COMPLETED | OUTPATIENT
Start: 2024-06-21 | End: 2024-06-21

## 2024-06-21 RX ORDER — FAMOTIDINE 10 MG/ML
20 INJECTION INTRAVENOUS ONCE
Refills: 0 | Status: COMPLETED | OUTPATIENT
Start: 2024-06-21 | End: 2024-06-21

## 2024-06-21 RX ADMIN — Medication 30 MILLILITER(S): at 20:00

## 2024-06-21 RX ADMIN — SODIUM CHLORIDE 1000 MILLILITER(S): 9 INJECTION INTRAMUSCULAR; INTRAVENOUS; SUBCUTANEOUS at 20:00

## 2024-06-21 RX ADMIN — Medication 400 MILLIGRAM(S): at 20:00

## 2024-06-21 RX ADMIN — FAMOTIDINE 20 MILLIGRAM(S): 10 INJECTION INTRAVENOUS at 20:00

## 2024-06-21 RX ADMIN — ONDANSETRON 4 MILLIGRAM(S): 8 TABLET, FILM COATED ORAL at 20:02

## 2024-06-21 NOTE — ED PROVIDER NOTE - PATIENT PORTAL LINK FT
You can access the FollowMyHealth Patient Portal offered by [facility] by registering at the following website: [email or web address]. By joining [facility]’s FollowMyHealth portal, you will also be able to view your health information using other applications (apps) compatible with our system.

## 2024-06-21 NOTE — ED PROVIDER NOTE - PHYSICAL EXAMINATION
Pt informed GENERAL: NAD  HEAD: normocephalic, atraumatic  HEENT: normal conjunctiva, oral mucosa moist, uvula midline, neck supple  CARDIAC: regular rate and rhythm, normal S1S2, no appreciable murmurs  PULM: speaking in full sentences, normal breath sounds, clear to ascultation bilaterally, no rales, rhonchi, wheezing  GI: abdomen nondistended, soft, diffuse ttp LLQ>RLQ  : no CVA tenderness b/l, no suprapubic tenderness  NEURO: moving all 4 extremities, no focal deficits, normal speech, AOx3  MSK: no peripheral edema, no calf tenderness b/l  SKIN: well-perfused, extremities warm  PSYCH: appropriate mood and affect

## 2024-06-21 NOTE — ED PROVIDER NOTE - CLINICAL SUMMARY MEDICAL DECISION MAKING FREE TEXT BOX
43-year-old female with a history of HTN, HLD presenting to the ED with lower abdominal pain for the last 3 days not significantly improved with Tylenol or Motrin.  Concern for IBS versus diverticulitis versus appendicitis versus gastritis.  Labs, CT A/P, UA/urine culture ordered. 43-year-old female with a history of HTN, HLD presenting to the ED with lower abdominal pain for the last 3 days not significantly improved with Tylenol or Motrin.  looks good,  abdomen soft with some mild left low q     Concern for IBS versus diverticulitis versus appendicitis versus gastritis.  Labs, CT A/P, UA/urine culture  ucg fluids reassess ZR

## 2024-06-21 NOTE — ED PROVIDER NOTE - PROGRESS NOTE DETAILS
Lucian, PGY-3, EM: pt tolerated po. ct showing epiploic appendigitis. will dc to f/u with pmd. labs unremarkable. Discussed with pt results of work up, strict return precautions, and need for follow up.  Pt expressed understanding and agrees with plan.

## 2024-06-21 NOTE — ED PROVIDER NOTE - MDM ORDERS SUBMITTED SELECTION
received report received pt ao offers no c/o awaiting transfer no harm to self or others Imaging Studies/Medications

## 2024-06-21 NOTE — ED PROVIDER NOTE - OBJECTIVE STATEMENT
43 yof with hx of HTN on HCTZ and HLD presents to the ED c/o lower abdominal pain x 3 days, worst in LLQ. States the pain nearly resolved for 1-2 hours yesterday and then came back, reports pain has been constant otherwise. Denies any n/v/d or blood in the stool, last BM this morning before work and last meal was approx 1 hour ago. Denies any history of IBS, IBD, or diverticulitis. Denies previous colonoscopy. Denies CP, SOB, fever, chills, fatigue, urinary changes or bowel habit changes. No sig substance use, NKDA.

## 2024-06-21 NOTE — ED PROVIDER NOTE - RAPID ASSESSMENT
44yo female no reported PMHxpresents to ED complaining of constant abdominal pain for the last 3 days.  Feels like it is more in the lower abdomen as opposed to upper and more on the left side as opposed to right.  Has tried Tylenol and Motrin at home without relief of pain.  Pain progressively worsened so went to urgent care today and was advised to come to ED for evaluation.  Last BM was this morning reportedly normal.  Denies fever/chills, n/v/d, chest pain, shortness of breath, urgency, dysuria, frequency, recent travel.    **Patient was rapidly assessed by Khang albert Kearney, PA-C. A limited history was obtained. The patient will be seen and further examined and worked up in the main ED and their care will be completed by the main ED team. Receiving team will follow up on labs, analgesia, any clinical imaging, and perform reassessment and disposition of the patient as clinically indicated. All decisions regarding the progression of care will be made at their discretion.

## 2024-06-21 NOTE — ED PROVIDER NOTE - NSFOLLOWUPINSTRUCTIONS_ED_ALL_ED_FT
Please follow up with your primary care physician within 2-3 days.   Return to the ER for any new or concerning symptoms.   You may take 975 mg acetaminophen every 6 hours as needed for pain.  You may take 600mg Ibuprofen (Advil) once every 8 hours as needed for pain. See medication label for warnings and use instructions.     You were seen in the ER for abdominal pain.  We performed a CT scan that you have something called epiploic appendagitis. You can take tylenol and motrin for pain.    Please review the information handout provided to you discussing the condition.    If you have any severe increase in pain, fever, uncontrollable nausea/vomiting, or inability to tolerate eating and drinking you need to come back to the emergency room. Wound Care: Petrolatum

## 2024-06-21 NOTE — ED ADULT NURSE REASSESSMENT NOTE - NS ED NURSE REASSESS COMMENT FT1
Report received from Sara GUERRERO. Pt A&Ox4, in no acute distress at time. Family remains at bedside. Patient safety maintained, bed is in lowest position, wheels locked, and side rails raised.

## 2024-06-22 VITALS
TEMPERATURE: 98 F | SYSTOLIC BLOOD PRESSURE: 136 MMHG | OXYGEN SATURATION: 97 % | HEART RATE: 68 BPM | RESPIRATION RATE: 18 BRPM | DIASTOLIC BLOOD PRESSURE: 86 MMHG

## 2024-06-22 LAB
CULTURE RESULTS: SIGNIFICANT CHANGE UP
SPECIMEN SOURCE: SIGNIFICANT CHANGE UP

## 2024-06-22 PROCEDURE — 85025 COMPLETE CBC W/AUTO DIFF WBC: CPT

## 2024-06-22 PROCEDURE — 82435 ASSAY OF BLOOD CHLORIDE: CPT

## 2024-06-22 PROCEDURE — 82947 ASSAY GLUCOSE BLOOD QUANT: CPT

## 2024-06-22 PROCEDURE — 82330 ASSAY OF CALCIUM: CPT

## 2024-06-22 PROCEDURE — 83690 ASSAY OF LIPASE: CPT

## 2024-06-22 PROCEDURE — 81001 URINALYSIS AUTO W/SCOPE: CPT

## 2024-06-22 PROCEDURE — 84132 ASSAY OF SERUM POTASSIUM: CPT

## 2024-06-22 PROCEDURE — 85018 HEMOGLOBIN: CPT

## 2024-06-22 PROCEDURE — 99284 EMERGENCY DEPT VISIT MOD MDM: CPT | Mod: 25

## 2024-06-22 PROCEDURE — 36415 COLL VENOUS BLD VENIPUNCTURE: CPT

## 2024-06-22 PROCEDURE — 85610 PROTHROMBIN TIME: CPT

## 2024-06-22 PROCEDURE — 74177 CT ABD & PELVIS W/CONTRAST: CPT | Mod: MC

## 2024-06-22 PROCEDURE — 82803 BLOOD GASES ANY COMBINATION: CPT

## 2024-06-22 PROCEDURE — 84702 CHORIONIC GONADOTROPIN TEST: CPT

## 2024-06-22 PROCEDURE — 85014 HEMATOCRIT: CPT

## 2024-06-22 PROCEDURE — 84295 ASSAY OF SERUM SODIUM: CPT

## 2024-06-22 PROCEDURE — 96375 TX/PRO/DX INJ NEW DRUG ADDON: CPT

## 2024-06-22 PROCEDURE — 83605 ASSAY OF LACTIC ACID: CPT

## 2024-06-22 PROCEDURE — 80053 COMPREHEN METABOLIC PANEL: CPT

## 2024-06-22 PROCEDURE — 85730 THROMBOPLASTIN TIME PARTIAL: CPT

## 2024-06-22 PROCEDURE — 87086 URINE CULTURE/COLONY COUNT: CPT

## 2024-06-22 PROCEDURE — 96374 THER/PROPH/DIAG INJ IV PUSH: CPT | Mod: XU

## 2024-06-22 RX ORDER — KETOROLAC TROMETHAMINE 30 MG/ML
30 SYRINGE (ML) INJECTION ONCE
Refills: 0 | Status: DISCONTINUED | OUTPATIENT
Start: 2024-06-22 | End: 2024-06-22

## 2024-06-22 RX ADMIN — Medication 30 MILLIGRAM(S): at 01:18

## 2024-07-16 NOTE — ED ADULT TRIAGE NOTE - ISOLATION TYPE:
Assessment/Plan   Diagnoses and all orders for this visit:  Refractive amblyopia of both eyes  Severe myopia of both eyes  Regular astigmatism of both eyes    Established patient, stable refractive error, improved visual acuity (VA) issued spec rx for full-time wear, reinforced importance. Ocular structures stable and and alignment normal. RTC 6mo        None

## 2024-10-29 NOTE — ED PROVIDER NOTE - NSICDXPASTSURGICALHX_GEN_ALL_CORE_FT
alert/oriented to person/oriented to place/oriented to time/oriented to situation PAST SURGICAL HISTORY:  H/O spinal fusion

## 2025-04-07 NOTE — ED ADULT NURSE NOTE - NS ED NURSE LEVEL OF CONSCIOUSNESS SPEECH
"Subjective   Mitzi Garcia is a 41 y.o. female who presents for Annual Exam and Referral (Mammogram order).  HPI  PMH:  NIL neg HPV 2/2020   Vasectomy     Here for CPE  Due for pap this yr   Mammo due this mo  Mood good overall  Tried leaxpro for perimeno Sx felt worse so stopped  Anxiety controlled.     Current Outpatient Medications on File Prior to Visit   Medication Sig Dispense Refill    [DISCONTINUED] escitalopram (Lexapro) 10 mg tablet TAKE 1 TABLET BY MOUTH EVERY DAY (Patient not taking: Reported on 4/7/2025) 90 tablet 1     No current facility-administered medications on file prior to visit.                  Objective   /86   Pulse 70   Temp 37 °C (98.6 °F)   Resp 15   Ht 1.562 m (5' 1.5\")   Wt 53 kg (116 lb 12.8 oz)   SpO2 98%   BMI 21.71 kg/m²    Physical Exam  General: NAD  HEENT:NCAT, PERRLA, nml OP, b/l TM clear   Neck: no cervical MADDI  Heart: RRR no murmur, no edema   Lungs: CTA b/l, no wheeze or rhonchi   GI: abd soft, nontender, nondistended.   MSK: no c/c/e. nml gait   Skin: warm and dry  Psych: cooperative, appropriate affect  Neuro: speech clear. A&Ox3  Assessment/Plan   Problem List Items Addressed This Visit    None  Visit Diagnoses       Annual physical exam    -  Primary  CPE:overall doing well. Cont balanced diet/reg ex   BMI: 21  VACC: reviewed, tdap UTD  COLON CA SCRN: 45  LABS: ordered  PAP: due, will nirmala w GYN   MAMMO: ordered  DEXA: 65  RTC yearly or prn     Relevant Orders    CBC and Auto Differential    Comprehensive Metabolic Panel    Hemoglobin A1C    Lipid Panel    TSH with reflex to Free T4 if abnormal    Encounter for screening mammogram for breast cancer        Relevant Orders    BI mammo bilateral screening tomosynthesis            "
Speaking Coherently

## 2025-04-21 ENCOUNTER — EMERGENCY (EMERGENCY)
Facility: HOSPITAL | Age: 45
LOS: 0 days | Discharge: HOME | End: 2025-04-21
Attending: STUDENT IN AN ORGANIZED HEALTH CARE EDUCATION/TRAINING PROGRAM
Payer: COMMERCIAL

## 2025-04-21 VITALS
HEART RATE: 74 BPM | DIASTOLIC BLOOD PRESSURE: 88 MMHG | SYSTOLIC BLOOD PRESSURE: 134 MMHG | OXYGEN SATURATION: 98 % | TEMPERATURE: 97 F | RESPIRATION RATE: 19 BRPM

## 2025-04-21 VITALS
RESPIRATION RATE: 18 BRPM | SYSTOLIC BLOOD PRESSURE: 125 MMHG | TEMPERATURE: 97 F | HEIGHT: 61 IN | HEART RATE: 75 BPM | DIASTOLIC BLOOD PRESSURE: 87 MMHG | WEIGHT: 138.89 LBS | OXYGEN SATURATION: 99 %

## 2025-04-21 DIAGNOSIS — Z98.1 ARTHRODESIS STATUS: Chronic | ICD-10-CM

## 2025-04-21 DIAGNOSIS — R07.89 OTHER CHEST PAIN: ICD-10-CM

## 2025-04-21 DIAGNOSIS — I10 ESSENTIAL (PRIMARY) HYPERTENSION: ICD-10-CM

## 2025-04-21 DIAGNOSIS — R07.81 PLEURODYNIA: ICD-10-CM

## 2025-04-21 LAB
ALBUMIN SERPL ELPH-MCNC: 3.8 G/DL — SIGNIFICANT CHANGE UP (ref 3.3–5)
ALP SERPL-CCNC: 78 U/L — SIGNIFICANT CHANGE UP (ref 40–120)
ALT FLD-CCNC: 22 U/L — SIGNIFICANT CHANGE UP (ref 12–78)
ANION GAP SERPL CALC-SCNC: 2 MMOL/L — LOW (ref 5–17)
AST SERPL-CCNC: 16 U/L — SIGNIFICANT CHANGE UP (ref 15–37)
BASOPHILS # BLD AUTO: 0.03 K/UL — SIGNIFICANT CHANGE UP (ref 0–0.2)
BASOPHILS NFR BLD AUTO: 0.4 % — SIGNIFICANT CHANGE UP (ref 0–2)
BILIRUB SERPL-MCNC: 0.4 MG/DL — SIGNIFICANT CHANGE UP (ref 0.2–1.2)
BUN SERPL-MCNC: 10 MG/DL — SIGNIFICANT CHANGE UP (ref 7–23)
CALCIUM SERPL-MCNC: 9.8 MG/DL — SIGNIFICANT CHANGE UP (ref 8.5–10.1)
CHLORIDE SERPL-SCNC: 112 MMOL/L — HIGH (ref 96–108)
CO2 SERPL-SCNC: 26 MMOL/L — SIGNIFICANT CHANGE UP (ref 22–31)
CREAT SERPL-MCNC: 0.77 MG/DL — SIGNIFICANT CHANGE UP (ref 0.5–1.3)
D DIMER BLD IA.RAPID-MCNC: <150 NG/ML DDU — SIGNIFICANT CHANGE UP
EGFR: 97 ML/MIN/1.73M2 — SIGNIFICANT CHANGE UP
EGFR: 97 ML/MIN/1.73M2 — SIGNIFICANT CHANGE UP
EOSINOPHIL # BLD AUTO: 0.13 K/UL — SIGNIFICANT CHANGE UP (ref 0–0.5)
EOSINOPHIL NFR BLD AUTO: 1.9 % — SIGNIFICANT CHANGE UP (ref 0–6)
GLUCOSE SERPL-MCNC: 94 MG/DL — SIGNIFICANT CHANGE UP (ref 70–99)
HCG SERPL-ACNC: <1 MIU/ML — SIGNIFICANT CHANGE UP
HCT VFR BLD CALC: 42.6 % — SIGNIFICANT CHANGE UP (ref 34.5–45)
HGB BLD-MCNC: 14.3 G/DL — SIGNIFICANT CHANGE UP (ref 11.5–15.5)
IMM GRANULOCYTES NFR BLD AUTO: 0.3 % — SIGNIFICANT CHANGE UP (ref 0–0.9)
LYMPHOCYTES # BLD AUTO: 1.98 K/UL — SIGNIFICANT CHANGE UP (ref 1–3.3)
LYMPHOCYTES # BLD AUTO: 29.1 % — SIGNIFICANT CHANGE UP (ref 13–44)
MCHC RBC-ENTMCNC: 29.2 PG — SIGNIFICANT CHANGE UP (ref 27–34)
MCHC RBC-ENTMCNC: 33.6 G/DL — SIGNIFICANT CHANGE UP (ref 32–36)
MCV RBC AUTO: 86.9 FL — SIGNIFICANT CHANGE UP (ref 80–100)
MONOCYTES # BLD AUTO: 0.52 K/UL — SIGNIFICANT CHANGE UP (ref 0–0.9)
MONOCYTES NFR BLD AUTO: 7.6 % — SIGNIFICANT CHANGE UP (ref 2–14)
NEUTROPHILS # BLD AUTO: 4.13 K/UL — SIGNIFICANT CHANGE UP (ref 1.8–7.4)
NEUTROPHILS NFR BLD AUTO: 60.7 % — SIGNIFICANT CHANGE UP (ref 43–77)
NRBC BLD AUTO-RTO: 0 /100 WBCS — SIGNIFICANT CHANGE UP (ref 0–0)
PLATELET # BLD AUTO: 246 K/UL — SIGNIFICANT CHANGE UP (ref 150–400)
POTASSIUM SERPL-MCNC: 4.2 MMOL/L — SIGNIFICANT CHANGE UP (ref 3.5–5.3)
POTASSIUM SERPL-SCNC: 4.2 MMOL/L — SIGNIFICANT CHANGE UP (ref 3.5–5.3)
PROT SERPL-MCNC: 7.6 GM/DL — SIGNIFICANT CHANGE UP (ref 6–8.3)
RBC # BLD: 4.9 M/UL — SIGNIFICANT CHANGE UP (ref 3.8–5.2)
RBC # FLD: 12.3 % — SIGNIFICANT CHANGE UP (ref 10.3–14.5)
SODIUM SERPL-SCNC: 140 MMOL/L — SIGNIFICANT CHANGE UP (ref 135–145)
TROPONIN I, HIGH SENSITIVITY RESULT: 22.4 NG/L — SIGNIFICANT CHANGE UP
WBC # BLD: 6.81 K/UL — SIGNIFICANT CHANGE UP (ref 3.8–10.5)
WBC # FLD AUTO: 6.81 K/UL — SIGNIFICANT CHANGE UP (ref 3.8–10.5)

## 2025-04-21 PROCEDURE — 93010 ELECTROCARDIOGRAM REPORT: CPT

## 2025-04-21 PROCEDURE — 71046 X-RAY EXAM CHEST 2 VIEWS: CPT | Mod: 26

## 2025-04-21 PROCEDURE — 99284 EMERGENCY DEPT VISIT MOD MDM: CPT

## 2025-04-21 RX ORDER — ACETAMINOPHEN 500 MG/5ML
975 LIQUID (ML) ORAL ONCE
Refills: 0 | Status: COMPLETED | OUTPATIENT
Start: 2025-04-21 | End: 2025-04-21

## 2025-04-21 RX ORDER — LIDOCAINE HYDROCHLORIDE 20 MG/ML
1 JELLY TOPICAL ONCE
Refills: 0 | Status: COMPLETED | OUTPATIENT
Start: 2025-04-21 | End: 2025-04-21

## 2025-04-21 RX ADMIN — Medication 975 MILLIGRAM(S): at 11:58

## 2025-04-21 RX ADMIN — LIDOCAINE HYDROCHLORIDE 1 PATCH: 20 JELLY TOPICAL at 11:59

## 2025-04-21 NOTE — ED PROVIDER NOTE - CLINICAL SUMMARY MEDICAL DECISION MAKING FREE TEXT BOX
[place] : 44-year-old female with past medical history of hypertension presenting with chest pain. Patient states she has had left-sided chest pain for 1 week. Chest pain worse in the last 2 days. Chest pain located under the left breast, described as sharp, worse with inspiration. Patient states she takes Depo-Provera injections. Denies fevers, shortness of breath, nausea, vomiting, abdominal pain, recent travel, leg swelling, trauma. Pain is not worse with exertion. Physical exam reveals well-appearing female, heart rate regular, clear breath sounds bilaterally, soft nontender abdomen, no rashes on chest or back, no lower extremity edema, left rib tenderness. Possible costochondritis versus ACS versus PE. CBC, CMP, D-dimer, troponin, chest x-ray, EKG. Lita : 44-year-old female with past medical history of hypertension presenting with chest pain. Patient states she has had left-sided chest pain for 1 week. Chest pain worse in the last 2 days. Chest pain located under the left breast, described as sharp, worse with inspiration. Patient states she takes Depo-Provera injections. Denies fevers, shortness of breath, nausea, vomiting, abdominal pain, recent travel, leg swelling, trauma. Pain is not worse with exertion. Physical exam reveals well-appearing female, heart rate regular, clear breath sounds bilaterally, soft nontender abdomen, no rashes on chest or back, no lower extremity edema, left rib tenderness. Possible costochondritis versus ACS versus PE. CBC, CMP, D-dimer, troponin, chest x-ray, EKG.    ELLA Salcido: Workup reviewed - labs WNL/not actionable at this time, CXR w/ lungs clear bilaterally, EKG NSR. CP likely MSK in nature. Will DC home w/ medications for symptomatic relief and PMD/Cardio follow-up.

## 2025-04-21 NOTE — ED PROVIDER NOTE - ATTENDING APP SHARED VISIT CONTRIBUTION OF CARE
I have personally performed a face to face medical and diagnostic evaluation of the patient. I have discussed with and reviewed the KACIE's note and agree with the History, ROS, Physical Exam and MDM unless otherwise indicated. A brief summary of my personal evaluation and impression can be found in the MDM. I actively participated in the comanagement of this patient with the KACIE. I have personally reviewed all orders, study/imaging results, medication orders. I discussed indications for consultant evaluation and consultant recommendations with the KACIE when applicable, and have discussed the disposition plan with the KACIE.

## 2025-04-21 NOTE — ED PROVIDER NOTE - PATIENT PORTAL LINK FT
You can access the FollowMyHealth Patient Portal offered by [facility] by registering at the following website: [email or web address]. By joining [facility]’s FollowMyHealth portal, you will also be able to view your health information using other applications (apps) compatible with our system.

## 2025-04-21 NOTE — ED ADULT NURSE NOTE - NSFALLUNIVINTERV_ED_ALL_ED
Bed/Stretcher in lowest position, wheels locked, appropriate side rails in place/Call bell, personal items and telephone in reach/Instruct patient to call for assistance before getting out of bed/chair/stretcher/Non-slip footwear applied when patient is off stretcher/[place] to call system/Physically safe environment - no spills, clutter or unnecessary equipment/Purposeful proactive rounding/Room/bathroom lighting operational, light cord in reach

## 2025-04-21 NOTE — ED PROVIDER NOTE - PROGRESS NOTE DETAILS
ELLA Salcido: Received patient from [facility]. [place] pending EKG, CXR, Labs. 44F w/ PMH HTN who presents to ED w/ left-sided chest pain x 1 week. Pt reporting sharp intermittent, left-sided chest pain underneath left breast area, worsens w/ direct pressure and w/ movement/deep breathing. + OCP use. Denies fever, SOB, abd pain, N/V/D, syncope, extremity weakness, lightheadedness, dizziness, or headaches.    On PE - VSS, pt well-appearing, no acute distress, heart w/ RRR, lungs clear bilaterally, + ttp overlying the left midaxillary/rib area, no LE edema, no focal neuro deficits.     Workup reviewed - labs WNL/not actionable at this time, CXR w/ lungs clear bilaterally, EKG NSR. Results endorsed including unexpected incidental findings (copy of reports provided to patient). Shared Decision Making - Reassessment performed, pt well-appearing, resting comfortably on stretcher, reports some improvement of pain w/ medications. Patient is medically stable for discharge. Strict return precautions given, discussed red flag signs/symptoms. Patient to follow up with PMD, Cardio. Patient/parent displays understanding and agreeable with plan, comfortable with discharge plan home. Plan for discharge discussed with Dr. London who agrees with disposition and discharge plan.

## 2025-04-21 NOTE — ED PROVIDER NOTE - HIV OFFER
PCN Clot retention Nontunneled HD catheter Tachycardia Previously Declined (within the last year) Renal failure, Hyperkalemia

## 2025-04-21 NOTE — ED PROVIDER NOTE - NSFOLLOWUPINSTRUCTIONS_ED_ALL_ED_FT
- Follow-up with your Primary Care Physician within the next week.  - Follow-up with Cardiology for persistent chest pain.    Medications  - Take Tylenol (Acetaminophen) 650 mg every 4-6 hours AND/OR Motrin (Ibuprofen) 600 mg every 6-8 hours as needed for pain/fever.    Advance activity as tolerated.  Continue all previously prescribed medications as directed unless otherwise instructed.  Follow up with your primary care physician in 48-72 hours- bring copies of your results.  Return to the ER for worsening or persistent symptoms, and/or ANY NEW OR CONCERNING SYMPTOMS such as fever, chest pain, shortness of breath, abdominal pain, or headaches. If you have issues obtaining follow up, please call: [phone] to obtain a doctor or specialist who takes your insurance in your area.  You may call [phone] to make an appointment with the internal medicine clinic.    Chest pain can be caused by many different conditions. It can be caused by a condition that is life-threatening and requires treatment right away. It can also be caused by something that is not life-threatening. If you have chest pain, it can be hard to know the difference, so it is important to get help right away to make sure that you do not have a serious condition.    Some life-threatening causes of chest pain include:    Heart attack.  A tear in the body's main blood vessel (aortic dissection).  Inflammation around your heart (pericarditis).  A problem in the lungs, such as a blood clot (pulmonary embolism) or a collapsed lung (pneumothorax).    Some non life-threatening causes of chest pain include:    Heartburn.  Anxiety or stress.  Damage to the bones, muscles, and cartilage that make up your chest wall.  Pneumonia or bronchitis.  Shingles infection (varicella-zoster virus).    Chest pain can feel like:    Pain or discomfort on the surface of your chest or deep in your chest.  Crushing, pressure, aching, or squeezing pain.  Burning or tingling.  Dull or sharp pain that is worse when you move, cough, or take a deep breath.  Pain or discomfort that is also felt in your back, neck, jaw, shoulder, or arm, or pain that spreads to any of these areas.    Your chest pain may come and go. It may also be constant. Your health care provider will do lab tests and other studies to find the cause of your pain. Treatment will depend on the cause of your chest pain.    Follow these instructions at home:    Medicines    Take over-the-counter and prescription medicines only as told by your health care provider.  If you were prescribed an antibiotic, take it as told by your health care provider. Do not stop taking the antibiotic even if you start to feel better.    Lifestyle     Rest as directed by your health care provider.  Do not use any products that contain nicotine or tobacco, such as cigarettes and e-cigarettes. If you need help quitting, ask your health care provider.  Do not drink alcohol.  Make healthy lifestyle choices as recommended. These may include:    Getting regular exercise. Ask your health care provider to suggest some activities that are safe for you.  Eating a heart-healthy diet. This includes plenty of fresh fruits and vegetables, whole grains, low-fat (lean) protein, and low-fat dairy products. A dietitian can help you find healthy eating options.  Maintaining a healthy weight.  Managing any other health conditions you have, such as high blood pressure (hypertension) or diabetes.  Reducing stress, such as with yoga or relaxation techniques.    General instructions    Pay attention to any changes in your symptoms. Tell your health care provider about them or any new symptoms.  Avoid any activities that cause chest pain.  Keep all follow-up visits as told by your health care provider. This is important. This includes visits for any further testing if your chest pain does not go away.    Contact a health care provider if:  Your chest pain does not go away.  You feel depressed.  You have a fever.    Get help right away if:  Your chest pain gets worse.  You have a cough that gets worse, or you cough up blood.  You have severe pain in your abdomen.  You faint.  You have sudden, unexplained chest discomfort.  You have sudden, unexplained discomfort in your arms, back, neck, or jaw.  You have shortness of breath at any time.  You suddenly start to sweat, or your skin gets clammy.  You feel nausea or you vomit.  You suddenly feel lightheaded or dizzy.  You have severe weakness, or unexplained weakness or fatigue.  Your heart begins to beat quickly, or it feels like it is skipping beats.    These symptoms may represent a serious problem that is an emergency. Do not wait to see if the symptoms will go away. Get medical help right away. Call your local emergency services (911 in the [place] Do not drive yourself to the hospital.    Summary  Chest pain can be caused by a condition that is serious and requires urgent treatment. It may also be caused by something that is not life-threatening.  If you have chest pain, it is very important to see your health care provider. Your health care provider may do lab tests and other studies to find the cause of your pain.  Follow your health care provider's instructions on taking medicines, making lifestyle changes, and getting emergency treatment if symptoms become worse.  Keep all follow-up visits as told by your health care provider. This includes visits for any further testing if your chest pain does not go away.    ADDITIONAL NOTES AND INSTRUCTIONS    Please follow up with your Primary MD in 24-48 hr.  Seek immediate medical care for any new/worsening signs or symptoms.

## 2025-04-21 NOTE — ED ADULT TRIAGE NOTE - CHIEF COMPLAINT QUOTE
pt c/o intermittent chest pain for 2 day. pain is worst with breathing.  denies dizziness or radiation of the pain. history of htn.

## 2025-04-21 NOTE — ED ADULT NURSE NOTE - OBJECTIVE STATEMENT
44 y.o female, A&Ox4, c/o chest pain x 1 week. pt states midsternal radiating to left side and to back. pt states last 2 days pain has worsened. Patient denies any sob, difficulty breathing, dizziness, headache, vision changes, palpations, abdominal pain, n/v/d, fever, chills, numbness, tingling, urinary symptoms, LE swelling. pt states she is unsure if she lifted something heavy that may attribute to her pain. EKG completed.

## 2025-04-21 NOTE — ED ADULT NURSE NOTE - BREATH SOUNDS, MLM
Black Foam Text: Pack wound with black foam. Kci 150 Text: Apply KCI NPWT at 150mmHg continuous suction. The Following Was Applied To The Following Wounds (A)?: site:  petrolatum, telfa, gauze, tape Abdominal Binder/Girdle Text: Please wear abdominal binder/girdle at all times to improve wound healing. Increase Protein Intake By 60 Grams Text: Increase Protein intake to promote wound healing. Goal is 60 extra grams of protein daily. May supplement with protein shakes or bars. Add Different Orders For Other Wounds: No Renasys 140 Text: Apply Renasys NPWT at 140mmHg continuous suction. Triamcinalone Ointment Text: Apply triamcinolone ointment to the periwound as needed. Tape Text: Secure dressings with tape. Plain Packing Text: Pack wound with plain packing extending to wound base. Darco Ortho Wedge Text: Implement use of Darco ortho wedge any time you are walking. Limit walking as much as possible. Kci 125 Text: Apply KCI NPWT at 125mmHg continuous suction. Injectable 1% Lidocaine Text: Inject to wound bed prior to procedure as needed for pain. WCC only. Darco Heel Wedge Text: Implement use of Darco heel wedge any time you are walking. Limit walking as much as possible. No-Sting Text: Apply No-Sting skin barrier to periwound skin prior to applying bandages or tape. Nita Seal Text: Apply Nita seal to the periwound PRN. Use Provider Dressing Detailed Above: Yes - (A) Keep Dressing Clean And Dry Text: No tub baths or soaking. Remove dressing only if gets soiled or become wet. May use plastic bag, transparent film, Aquagaurd, cast protector, vac drape, etc to keep the dressing dry. For lower and upper extremity wounds, may purchase a reusable cast protector. When wrapping leg or foot with plastic covering during bathing take precautions to not slip/fall (ex: shower chair). Recommend bathing prior to dressing changes. Mesalt Packing Text: Pack wound with strips of Mesalt, ensuring that packing is placed to the bottom of the wound and does not touch the periwound. 40-40 Mmhg Thigh High Compression Stockings Text: Wear 30-40 mmHg thigh high compression stockings daily. They can be taken off while sleeping but must but need to be replaced after getting out of bed. Ensure adequate compression is maintained; garments often require replacing after 3-6 months of wear. Follow the 's instructions regarding lotions, washing and drying. Wounds Are Healed Text: Continue to cleanse with normal saline, blot dry and keep covered for at least one week to strengthen the skin. After one week, you may shower and cleanse area with mild soap and water. Do not scrub newly healed areas. Barrier Paste Text: Apply barrier paste to periwound. Posey Heel Gaurd Text: Implement use of Posey Heel Gaurd while lying to prevent pressure. Bordered Gauze Text: Apply over primary dressing. Maintain Glucose Control Text: Please keep blood sugars well controlled and less than 150. Please continue to follow up with your prescribing provider if you are unable to control your glucose. Silver Alginate Packing Text: Pack wound with silver alginate packing, extending packing to the base of the wound. Topical Ointment/Moisturizing Cream Text: Apply topical moisturizing cream to skin surrounding the wound. Do not apply lotion to wound bed. Tubular Wrap Text: Wrap tubular gauze over fingers/toes, securing dressing inside. Barrier Ointment Text: Apply thin film of Barrier ointment to protect skin surrounding the wound. Do not get ointment in the wound bed. Clear Marty 80 Text: Apply MARTY NPWT at 80mmHg continuous suction. Hold Npwt Text: Negative pressure wound therapy is on hold starting today. Please bring supplies to your next appointment in case wound vac therapy needs to be re-initiated. Alginate Packing Text: Pack wound with alginate packing, extending packing to the base of the wound. Stretch Net Text: Secure dressings with stretch net. Increase Protein Intake By 30 Grams Text: Increase Protein intake to promote wound healing. Goal is 30 extra grams of protein daily. May supplement with protein shakes or bars. 20-30 Mmhg Knee High Compression Stockings Text: Wear 20-30 mmHg knee high compression stockings daily. They can be taken off while sleeping but must but need to be replaced after getting out of bed. Ensure adequate compression is maintained; garments often require replacing after 3-6 months of wear. Follow the 's instructions regarding lotions, washing and drying. Repositioning Text: Turn and reposition every 1-2 hours while awake. Limit side lying to 30 degree tilt. Limit elevation of the head of the bed to 30 degrees. Stella Wheelchair Cushion Text: Avoid direct pressure on the wound. Do not use donut devices. Shift position in chair every 15 minutes. Unna Boot Text: An UNNA boot compression wrap has been placed from the base of the toes to the base of the knee (about 2-3 fingers breadth below the fold of the knee).  A 4 inch wide cohesive wrap (Coban) has been placed on top of the UNNA boot as the outer layer. Monitor for tingling, pain, discoloration, or numbness to foot or toes. If any of the above symptoms occur, first elevate the legs and reassess in 30-60 minutes. If discomfort continues, remove the compression wrap and call the wound care center or your home health nurse. Do not keep a compression wrap on for more than 7 days. Unna Boot With Cast Padding Text: An UNNA boot compression wrap has been placed from the base of the toes to the base of the knee (about 2-3 fingers breadth below the fold of the knee). Cast padding has been placed as the outer layer. A 4 inch wide cohesive wrap (Coban) has been placed on top of the UNNA boot as the outer layer. Monitor for tingling, pain, discoloration, or numbness to foot or toes. If any of the above symptoms occur, first elevate the legs and reassess in 30-60 minutes. If discomfort continues, remove the compression wrap and call the wound care center or your home health nurse. Do not keep a compression wrap on for more than 7 days. Cleanse Wound With Anisept Text: Prior to dressing change, wash hands and remove old dressing. Using gloved hands, cleanse wound and periwound with Anisept with sponge gauze. Do not use tissues or cotton balls. Do not scrub or use excessive force. Pat dry using gauze sponges. Endoform Text: Apply to the base of the wound. Moisten with SNS prior to application if wound bed is dry or if previous Endoform dressing dried out between dressing changes. Walking Boot Text: Implement use of walking boot any time you are walking or transferring. Limit walking as much as possible. Coccyx Cushion Text: Use Coccyx Cushion while sitting. Avoid direct pressure on the wound. Do not use Donut cushion. Diabetic Shoe With Orthotics Text: Wear custom diabetic shoe with orthotics any time you are walking. Limit walking as much as possible. Knee Scooter Text: Use knee scooter when ambulating. Elevation Text: Elevate the legs above the heart and exercise legs to reduce swelling. Renasys 120 Text: Apply Renasys NPWT at 120mmHg continuous suction. Non-Weight Bearing Text: Keep weight off affected area at all times. Cleanse Wound With Normal Saline Text: Prior to dressing change, wash hands and remove old dressing. Using gloved hands, cleanse wound and periwound with normal saline or soap and water with sponge gauze. Do not use tissues or cotton balls. Use enough force and friction to remove lightly adherent fibrin. Pat dry using gauze sponges. Topical 4% Lidocaine Text: Apply to wound bed as needed for pain. WCC only. Custom Orthotics Text: Wear custom orthotics any time you are walking. Limit walking as much as possible. Compression Wrap Text: A compression wrap has been placed from the base of the toes to the base of the knee. Monitor for tingling, pain, discoloration, or numbness to foot or toes. If any of the above symptoms occur, first elevate the legs and reassess in 30-60 minutes. If discomfort continues, remove the compression wrap and call the wound care center. Do not keep a compression wrap on for more than 7 days. Santyl Ointment Text: Apply a nickel-thick layer to wound bed. Regranex Text: Keep refrigerated at all times Referral For Custom Orthotics Text: A referral for custom orthotics has been ordered for your neuropathic wound. A representative from the orthotics office will be contacting you. Iodoform Packing Text: Pack wound with iodoform packing, extending packing to the base of the wound. 20-30 Mmhg Thigh High Compression Stockings Text: Wear 20-30 mmHg thigh high compression stockings daily. They can be taken off while sleeping but must but need to be replaced after getting out of bed. Ensure adequate compression is maintained; garments often require replacing after 3-6 months of wear. Follow the 's instructions regarding lotions, washing and drying. Do Not Use Compression Text: Do NOT place any compression, including ACE wrap, Coban, tightly wrapped rolled gauze. Cleanse Wound With Dakins Text: Prior to dressing change, wash hands and remove old dressing. Using gloved hands, cleanse the wound and periwound with Dakins using gauze sponges. Allow the Dakins soaked gauze to soak on the wound for 10 minutes. Do not use tissues or cotton balls. Do not scrub or use excessive force. Pat dry using gauze sponges. Antifungal Powder Text: Apply Antifungal powder to the periwound. Pneumatic Compression Text: Pneumatic compression pumps can be used with compression wraps in place. Use pneumatic compression pumps for 3 hours divided throughout the day. Continue Home Health Care Text: I am the provider seeing the patient today. I certify that based on my findings the following services are medically necessary home health services: Skilled Nursing Care. I certify that this patient is under my care and that I have had a face to face encounter with the patient today. I certify that my clinical findings support that the patient is homebound. 30-40 Mmhg Knee High Compression Stockings Text: Wear 30-40 mmHg knee high compression stockings daily. They can be taken off while sleeping but must but need to be replaced after getting out of bed. Ensure adequate compression is maintained; garments often require replacing after 3-6 months of wear. Follow the 's instructions regarding lotions, washing and drying. Hydrocolloid Text: Apply hydrocolloid to periwound. Multi-Podus Heel Protector Text: Implement use of Multi-Podus - heel protector boot any time you are walking or transferring. Limit walking as much as possible. Pegasssist Text: Use PegAssist inside offloading shoes. Detail Level: Detailed Skin Substitute Text: We have placed a skin substitute product on your wound today. The skin substitute is covered with a silicone adherent dressing and secured with steri strips. Please do NOT remove the silicone dressing or any dressing held in place by the steri strips. Please do NOT wash the wound with saline. The outer dressing can be removed and changed to control drainage. Total Contact Cast Text: Total contact cast by T[facility]-EZ applied. Do not get cast wet. Contact wound center if there is a foul odor or becomes uncomfortable due to feeling tight or swelling. Do not use objects down inside of cast to scratch. Wear black walking boot at all times as cast will crack if pressure is placed on it. Please limit activities to transfers and very light ambulation. Gel Overlay Mattress Text: Offload with gel overlay mattress or specialty bedding. Admit To Home Health Care Text: Please admit patient to home health for wound care and continue until discharged. I am the provider seeing the patient today. I certify that based on my findings the following services are medically necessary home health services: Skilled Nursing Care. I certify that this patient is under my care and that I have had a face to face encounter with the patient today. I certify that my clinical findings support that the patient is homebound. Dc Npwt Text: Negative pressure wound therapy has been discontinued. Call and return the device to the . Alden Periwound Erythema Text: Alden periwound erythema with skin marker. Mesalt Text: Apply to the wound base, ensuring good contact with the entire area. Mesalt should not touch the periwound and must be changed at least every 24 hours. Exercise Text: Calf pump exercises to reduce edema. Order Npwt Text: We will order a negative pressure wound therapy device to assist in healing your wound. This process can take 1-2 weeks, and its use is dependent upon your wound progress. Tubular Bandage Text: Wear Spandagrip tubular stockings over all wound dressings, from the base of toes up to the base of the knee. Stockings should be worn at all times. They can be taken off while sleeping but must but need to be replaced after getting out of bed. Stockings may be re-used. Hand wash and line dry as needed for soiling. If wraps cause discomfort, tingling, pain, discoloration, or numbness, first elevate the legs and reassess in 30-60 minutes. If discomfort continues, remove stocking and call the wound care center.